# Patient Record
Sex: FEMALE | Race: WHITE | Employment: OTHER | URBAN - METROPOLITAN AREA
[De-identification: names, ages, dates, MRNs, and addresses within clinical notes are randomized per-mention and may not be internally consistent; named-entity substitution may affect disease eponyms.]

---

## 2017-01-16 ENCOUNTER — APPOINTMENT (OUTPATIENT)
Dept: GENERAL RADIOLOGY | Age: 82
DRG: 208 | End: 2017-01-16
Attending: PHYSICIAN ASSISTANT
Payer: MEDICARE

## 2017-01-16 ENCOUNTER — HOSPITAL ENCOUNTER (INPATIENT)
Age: 82
LOS: 1 days | Discharge: ANOTHER HOSPITAL | DRG: 208 | End: 2017-01-18
Attending: EMERGENCY MEDICINE | Admitting: HOSPITALIST
Payer: MEDICARE

## 2017-01-16 DIAGNOSIS — J96.01 ACUTE RESPIRATORY FAILURE WITH HYPOXIA (HCC): Primary | ICD-10-CM

## 2017-01-16 DIAGNOSIS — I48.91 ATRIAL FIBRILLATION WITH RVR (HCC): ICD-10-CM

## 2017-01-16 PROBLEM — J81.1 PULMONARY EDEMA: Status: ACTIVE | Noted: 2017-01-16

## 2017-01-16 PROBLEM — I46.9 CARDIAC ARREST (HCC): Status: ACTIVE | Noted: 2017-01-16

## 2017-01-16 PROBLEM — R19.7 DIARRHEA: Status: ACTIVE | Noted: 2017-01-16

## 2017-01-16 LAB
ABO + RH BLD: NORMAL
ALBUMIN SERPL BCP-MCNC: 3.5 G/DL (ref 3.4–5)
ALBUMIN/GLOB SERPL: 0.8 {RATIO} (ref 0.8–1.7)
ALP SERPL-CCNC: 115 U/L (ref 45–117)
ALT SERPL-CCNC: 11 U/L (ref 13–56)
ANION GAP BLD CALC-SCNC: 9 MMOL/L (ref 3–18)
APPEARANCE UR: CLEAR
APTT PPP: 26.6 SEC (ref 23–36.4)
ARTERIAL PATENCY WRIST A: YES
AST SERPL W P-5'-P-CCNC: 4 U/L (ref 15–37)
BACTERIA URNS QL MICRO: ABNORMAL /HPF
BASE DEFICIT BLD-SCNC: 7 MMOL/L
BASOPHILS # BLD AUTO: 0 K/UL (ref 0–0.06)
BASOPHILS # BLD: 0 % (ref 0–2)
BDY SITE: ABNORMAL
BILIRUB SERPL-MCNC: 0.3 MG/DL (ref 0.2–1)
BILIRUB UR QL: NEGATIVE
BLOOD GROUP ANTIBODIES SERPL: NORMAL
BNP SERPL-MCNC: 1958 PG/ML (ref 0–1800)
BUN SERPL-MCNC: 14 MG/DL (ref 7–18)
BUN/CREAT SERPL: 13 (ref 12–20)
CALCIUM SERPL-MCNC: 10.1 MG/DL (ref 8.5–10.1)
CHLORIDE SERPL-SCNC: 102 MMOL/L (ref 100–108)
CK MB CFR SERPL CALC: 2.5 % (ref 0–4)
CK MB SERPL-MCNC: 1.4 NG/ML (ref 0.5–3.6)
CK SERPL-CCNC: 56 U/L (ref 26–192)
CO2 SERPL-SCNC: 28 MMOL/L (ref 21–32)
COLOR UR: YELLOW
CREAT SERPL-MCNC: 1.07 MG/DL (ref 0.6–1.3)
DIFFERENTIAL METHOD BLD: NORMAL
EOSINOPHIL # BLD: 0.3 K/UL (ref 0–0.4)
EOSINOPHIL NFR BLD: 4 % (ref 0–5)
ERYTHROCYTE [DISTWIDTH] IN BLOOD BY AUTOMATED COUNT: 12.6 % (ref 11.6–14.5)
EST. AVERAGE GLUCOSE BLD GHB EST-MCNC: 160 MG/DL
FLUAV AG NPH QL IA: NEGATIVE
FLUBV AG NOSE QL IA: NEGATIVE
GAS FLOW.O2 O2 DELIVERY SYS: ABNORMAL L/MIN
GAS FLOW.O2 SETTING OXYMISER: 14 BPM
GLOBULIN SER CALC-MCNC: 4.2 G/DL (ref 2–4)
GLUCOSE BLD STRIP.AUTO-MCNC: 135 MG/DL (ref 70–110)
GLUCOSE SERPL-MCNC: 156 MG/DL (ref 74–99)
GLUCOSE UR STRIP.AUTO-MCNC: NEGATIVE MG/DL
HBA1C MFR BLD: 7.2 % (ref 4.5–5.6)
HCO3 BLD-SCNC: 19.9 MMOL/L (ref 22–26)
HCT VFR BLD AUTO: 43.6 % (ref 35–45)
HGB BLD-MCNC: 14.2 G/DL (ref 12–16)
HGB UR QL STRIP: NEGATIVE
INR PPP: 0.8 (ref 0.8–1.2)
INSPIRATION.DURATION SETTING TIME VENT: 0.9 SEC
KETONES UR QL STRIP.AUTO: NEGATIVE MG/DL
LACTATE SERPL-SCNC: 1.6 MMOL/L (ref 0.4–2)
LEUKOCYTE ESTERASE UR QL STRIP.AUTO: ABNORMAL
LIPASE SERPL-CCNC: 205 U/L (ref 73–393)
LYMPHOCYTES # BLD AUTO: 21 % (ref 21–52)
LYMPHOCYTES # BLD: 1.5 K/UL (ref 0.9–3.6)
MCH RBC QN AUTO: 31.5 PG (ref 24–34)
MCHC RBC AUTO-ENTMCNC: 32.6 G/DL (ref 31–37)
MCV RBC AUTO: 96.7 FL (ref 74–97)
MONOCYTES # BLD: 0.4 K/UL (ref 0.05–1.2)
MONOCYTES NFR BLD AUTO: 5 % (ref 3–10)
NEUTS SEG # BLD: 4.9 K/UL (ref 1.8–8)
NEUTS SEG NFR BLD AUTO: 70 % (ref 40–73)
NITRITE UR QL STRIP.AUTO: NEGATIVE
O2/TOTAL GAS SETTING VFR VENT: 100 %
PCO2 BLD: 46.3 MMHG (ref 35–45)
PEEP RESPIRATORY: 5 CMH2O
PH BLD: 7.24 [PH] (ref 7.35–7.45)
PH UR STRIP: 6 [PH] (ref 5–8)
PLATELET # BLD AUTO: 210 K/UL (ref 135–420)
PMV BLD AUTO: 10.7 FL (ref 9.2–11.8)
PO2 BLD: 258 MMHG (ref 80–100)
POTASSIUM SERPL-SCNC: 4.5 MMOL/L (ref 3.5–5.5)
PROT SERPL-MCNC: 7.7 G/DL (ref 6.4–8.2)
PROT UR STRIP-MCNC: NEGATIVE MG/DL
PROTHROMBIN TIME: 11.2 SEC (ref 11.5–15.2)
RBC # BLD AUTO: 4.51 M/UL (ref 4.2–5.3)
RBC #/AREA URNS HPF: 0 /HPF (ref 0–5)
SAO2 % BLD: 100 % (ref 92–97)
SERVICE CMNT-IMP: ABNORMAL
SODIUM SERPL-SCNC: 139 MMOL/L (ref 136–145)
SP GR UR REFRACTOMETRY: 1.02 (ref 1–1.03)
SPECIMEN EXP DATE BLD: NORMAL
SPECIMEN TYPE: ABNORMAL
TOTAL RESP. RATE, ITRR: 14
TROPONIN I SERPL-MCNC: 0.02 NG/ML (ref 0–0.06)
UROBILINOGEN UR QL STRIP.AUTO: 0.2 EU/DL (ref 0.2–1)
VENTILATION MODE VENT: ABNORMAL
VOLUME CONTROL IVLC: YES
VT SETTING VENT: 450 ML
WBC # BLD AUTO: 7.1 K/UL (ref 4.6–13.2)
WBC URNS QL MICRO: ABNORMAL /HPF (ref 0–5)

## 2017-01-16 PROCEDURE — 77010033678 HC OXYGEN DAILY

## 2017-01-16 PROCEDURE — 85610 PROTHROMBIN TIME: CPT | Performed by: PHYSICIAN ASSISTANT

## 2017-01-16 PROCEDURE — 93005 ELECTROCARDIOGRAM TRACING: CPT

## 2017-01-16 PROCEDURE — 85730 THROMBOPLASTIN TIME PARTIAL: CPT | Performed by: PHYSICIAN ASSISTANT

## 2017-01-16 PROCEDURE — 77030020454 HC TU ET CUF HI/LO COVD -B

## 2017-01-16 PROCEDURE — 74011000258 HC RX REV CODE- 258: Performed by: HOSPITALIST

## 2017-01-16 PROCEDURE — 86900 BLOOD TYPING SEROLOGIC ABO: CPT | Performed by: PHYSICIAN ASSISTANT

## 2017-01-16 PROCEDURE — 82550 ASSAY OF CK (CPK): CPT | Performed by: PHYSICIAN ASSISTANT

## 2017-01-16 PROCEDURE — 74011250636 HC RX REV CODE- 250/636

## 2017-01-16 PROCEDURE — 92950 HEART/LUNG RESUSCITATION CPR: CPT

## 2017-01-16 PROCEDURE — 77030034850

## 2017-01-16 PROCEDURE — 87804 INFLUENZA ASSAY W/OPTIC: CPT | Performed by: PHYSICIAN ASSISTANT

## 2017-01-16 PROCEDURE — 77030008477 HC STYL SATN SLP COVD -A

## 2017-01-16 PROCEDURE — 83690 ASSAY OF LIPASE: CPT | Performed by: PHYSICIAN ASSISTANT

## 2017-01-16 PROCEDURE — 31500 INSERT EMERGENCY AIRWAY: CPT

## 2017-01-16 PROCEDURE — 83036 HEMOGLOBIN GLYCOSYLATED A1C: CPT | Performed by: HOSPITALIST

## 2017-01-16 PROCEDURE — 74011250636 HC RX REV CODE- 250/636: Performed by: HOSPITALIST

## 2017-01-16 PROCEDURE — 82803 BLOOD GASES ANY COMBINATION: CPT

## 2017-01-16 PROCEDURE — 99285 EMERGENCY DEPT VISIT HI MDM: CPT

## 2017-01-16 PROCEDURE — 74011250637 HC RX REV CODE- 250/637: Performed by: INTERNAL MEDICINE

## 2017-01-16 PROCEDURE — 83880 ASSAY OF NATRIURETIC PEPTIDE: CPT | Performed by: PHYSICIAN ASSISTANT

## 2017-01-16 PROCEDURE — 82962 GLUCOSE BLOOD TEST: CPT

## 2017-01-16 PROCEDURE — 74011000258 HC RX REV CODE- 258: Performed by: EMERGENCY MEDICINE

## 2017-01-16 PROCEDURE — 74011250636 HC RX REV CODE- 250/636: Performed by: EMERGENCY MEDICINE

## 2017-01-16 PROCEDURE — 5A2204Z RESTORATION OF CARDIAC RHYTHM, SINGLE: ICD-10-PCS | Performed by: EMERGENCY MEDICINE

## 2017-01-16 PROCEDURE — 83605 ASSAY OF LACTIC ACID: CPT | Performed by: PHYSICIAN ASSISTANT

## 2017-01-16 PROCEDURE — 0BH17EZ INSERTION OF ENDOTRACHEAL AIRWAY INTO TRACHEA, VIA NATURAL OR ARTIFICIAL OPENING: ICD-10-PCS | Performed by: EMERGENCY MEDICINE

## 2017-01-16 PROCEDURE — 96361 HYDRATE IV INFUSION ADD-ON: CPT

## 2017-01-16 PROCEDURE — 94762 N-INVAS EAR/PLS OXIMTRY CONT: CPT

## 2017-01-16 PROCEDURE — 81001 URINALYSIS AUTO W/SCOPE: CPT | Performed by: PHYSICIAN ASSISTANT

## 2017-01-16 PROCEDURE — 77030008683 HC TU ET CUF COVD -A

## 2017-01-16 PROCEDURE — 87040 BLOOD CULTURE FOR BACTERIA: CPT | Performed by: HOSPITALIST

## 2017-01-16 PROCEDURE — 36600 WITHDRAWAL OF ARTERIAL BLOOD: CPT

## 2017-01-16 PROCEDURE — 77030008768 HC TU NG VYGC -A

## 2017-01-16 PROCEDURE — 74011250636 HC RX REV CODE- 250/636: Performed by: INTERNAL MEDICINE

## 2017-01-16 PROCEDURE — 74011250636 HC RX REV CODE- 250/636: Performed by: PHYSICIAN ASSISTANT

## 2017-01-16 PROCEDURE — 5A1935Z RESPIRATORY VENTILATION, LESS THAN 24 CONSECUTIVE HOURS: ICD-10-PCS | Performed by: INTERNAL MEDICINE

## 2017-01-16 PROCEDURE — 5A12012 PERFORMANCE OF CARDIAC OUTPUT, SINGLE, MANUAL: ICD-10-PCS | Performed by: EMERGENCY MEDICINE

## 2017-01-16 PROCEDURE — 96375 TX/PRO/DX INJ NEW DRUG ADDON: CPT

## 2017-01-16 PROCEDURE — 74011000258 HC RX REV CODE- 258: Performed by: INTERNAL MEDICINE

## 2017-01-16 PROCEDURE — 85025 COMPLETE CBC W/AUTO DIFF WBC: CPT | Performed by: PHYSICIAN ASSISTANT

## 2017-01-16 PROCEDURE — 74011000250 HC RX REV CODE- 250: Performed by: HOSPITALIST

## 2017-01-16 PROCEDURE — 80053 COMPREHEN METABOLIC PANEL: CPT | Performed by: PHYSICIAN ASSISTANT

## 2017-01-16 PROCEDURE — 71010 XR CHEST PORT: CPT

## 2017-01-16 PROCEDURE — 94002 VENT MGMT INPAT INIT DAY: CPT

## 2017-01-16 PROCEDURE — 96365 THER/PROPH/DIAG IV INF INIT: CPT

## 2017-01-16 PROCEDURE — 74011000250 HC RX REV CODE- 250: Performed by: EMERGENCY MEDICINE

## 2017-01-16 RX ORDER — ENOXAPARIN SODIUM 100 MG/ML
40 INJECTION SUBCUTANEOUS EVERY 24 HOURS
Status: DISCONTINUED | OUTPATIENT
Start: 2017-01-16 | End: 2017-01-18 | Stop reason: HOSPADM

## 2017-01-16 RX ORDER — GENTAMICIN SULFATE 3 MG/ML
1 SOLUTION/ DROPS OPHTHALMIC 4 TIMES DAILY
COMMUNITY

## 2017-01-16 RX ORDER — PROPOFOL 10 MG/ML
5-50 VIAL (ML) INTRAVENOUS
Status: COMPLETED | OUTPATIENT
Start: 2017-01-16 | End: 2017-01-16

## 2017-01-16 RX ORDER — INSULIN LISPRO 100 [IU]/ML
INJECTION, SOLUTION INTRAVENOUS; SUBCUTANEOUS EVERY 6 HOURS
Status: DISCONTINUED | OUTPATIENT
Start: 2017-01-16 | End: 2017-01-17

## 2017-01-16 RX ORDER — DEXTROSE 50 % IN WATER (D50W) INTRAVENOUS SYRINGE
25-50 AS NEEDED
Status: DISCONTINUED | OUTPATIENT
Start: 2017-01-16 | End: 2017-01-18 | Stop reason: HOSPADM

## 2017-01-16 RX ORDER — SODIUM CHLORIDE 9 MG/ML
50 INJECTION, SOLUTION INTRAVENOUS CONTINUOUS
Status: DISCONTINUED | OUTPATIENT
Start: 2017-01-16 | End: 2017-01-16

## 2017-01-16 RX ORDER — MIDAZOLAM HYDROCHLORIDE 1 MG/ML
3 INJECTION, SOLUTION INTRAMUSCULAR; INTRAVENOUS
Status: COMPLETED | OUTPATIENT
Start: 2017-01-16 | End: 2017-01-16

## 2017-01-16 RX ORDER — LORAZEPAM 2 MG/ML
1 INJECTION INTRAMUSCULAR ONCE
Status: DISCONTINUED | OUTPATIENT
Start: 2017-01-16 | End: 2017-01-16 | Stop reason: CLARIF

## 2017-01-16 RX ORDER — MIDAZOLAM HYDROCHLORIDE 1 MG/ML
1 INJECTION, SOLUTION INTRAMUSCULAR; INTRAVENOUS
Status: COMPLETED | OUTPATIENT
Start: 2017-01-16 | End: 2017-01-16

## 2017-01-16 RX ORDER — TRIPROLIDINE/PSEUDOEPHEDRINE 2.5MG-60MG
400 TABLET ORAL
Status: DISCONTINUED | OUTPATIENT
Start: 2017-01-16 | End: 2017-01-16

## 2017-01-16 RX ORDER — FENTANYL CITRATE 50 UG/ML
25 INJECTION, SOLUTION INTRAMUSCULAR; INTRAVENOUS
Status: DISCONTINUED | OUTPATIENT
Start: 2017-01-16 | End: 2017-01-17

## 2017-01-16 RX ORDER — SODIUM CHLORIDE 9 MG/ML
500 INJECTION, SOLUTION INTRAVENOUS ONCE
Status: COMPLETED | OUTPATIENT
Start: 2017-01-16 | End: 2017-01-16

## 2017-01-16 RX ORDER — MAGNESIUM SULFATE 100 %
4 CRYSTALS MISCELLANEOUS AS NEEDED
Status: DISCONTINUED | OUTPATIENT
Start: 2017-01-16 | End: 2017-01-18 | Stop reason: HOSPADM

## 2017-01-16 RX ORDER — SOTALOL HYDROCHLORIDE 80 MG/1
80 TABLET ORAL EVERY 12 HOURS
Status: DISCONTINUED | OUTPATIENT
Start: 2017-01-16 | End: 2017-01-16

## 2017-01-16 RX ORDER — FUROSEMIDE 10 MG/ML
40 INJECTION INTRAMUSCULAR; INTRAVENOUS DAILY
Status: DISCONTINUED | OUTPATIENT
Start: 2017-01-17 | End: 2017-01-16

## 2017-01-16 RX ORDER — ETOMIDATE 2 MG/ML
20 INJECTION INTRAVENOUS
Status: COMPLETED | OUTPATIENT
Start: 2017-01-16 | End: 2017-01-16

## 2017-01-16 RX ORDER — FUROSEMIDE 10 MG/ML
80 INJECTION INTRAMUSCULAR; INTRAVENOUS
Status: COMPLETED | OUTPATIENT
Start: 2017-01-16 | End: 2017-01-16

## 2017-01-16 RX ORDER — METRONIDAZOLE 500 MG/100ML
500 INJECTION, SOLUTION INTRAVENOUS EVERY 8 HOURS
Status: DISCONTINUED | OUTPATIENT
Start: 2017-01-16 | End: 2017-01-18 | Stop reason: HOSPADM

## 2017-01-16 RX ORDER — ONDANSETRON 2 MG/ML
4 INJECTION INTRAMUSCULAR; INTRAVENOUS
Status: COMPLETED | OUTPATIENT
Start: 2017-01-16 | End: 2017-01-16

## 2017-01-16 RX ORDER — PROPOFOL 10 MG/ML
INJECTION, EMULSION INTRAVENOUS
Status: COMPLETED
Start: 2017-01-16 | End: 2017-01-16

## 2017-01-16 RX ORDER — MIDAZOLAM HYDROCHLORIDE 1 MG/ML
1 INJECTION, SOLUTION INTRAMUSCULAR; INTRAVENOUS
Status: DISCONTINUED | OUTPATIENT
Start: 2017-01-16 | End: 2017-01-17

## 2017-01-16 RX ORDER — MIDAZOLAM HYDROCHLORIDE 1 MG/ML
2 INJECTION, SOLUTION INTRAMUSCULAR; INTRAVENOUS
Status: COMPLETED | OUTPATIENT
Start: 2017-01-16 | End: 2017-01-16

## 2017-01-16 RX ORDER — SODIUM CHLORIDE 9 MG/ML
1000 INJECTION, SOLUTION INTRAVENOUS ONCE
Status: COMPLETED | OUTPATIENT
Start: 2017-01-16 | End: 2017-01-16

## 2017-01-16 RX ORDER — IPRATROPIUM BROMIDE AND ALBUTEROL SULFATE 2.5; .5 MG/3ML; MG/3ML
3 SOLUTION RESPIRATORY (INHALATION)
Status: DISCONTINUED | OUTPATIENT
Start: 2017-01-16 | End: 2017-01-18 | Stop reason: HOSPADM

## 2017-01-16 RX ORDER — DILTIAZEM HYDROCHLORIDE 5 MG/ML
INJECTION INTRAVENOUS
Status: DISPENSED
Start: 2017-01-16 | End: 2017-01-17

## 2017-01-16 RX ORDER — CHLORHEXIDINE GLUCONATE 1.2 MG/ML
10 RINSE ORAL EVERY 12 HOURS
Status: DISCONTINUED | OUTPATIENT
Start: 2017-01-16 | End: 2017-01-17

## 2017-01-16 RX ORDER — NOREPINEPHRINE BITARTRATE/D5W 8 MG/250ML
2-16 PLASTIC BAG, INJECTION (ML) INTRAVENOUS
Status: DISCONTINUED | OUTPATIENT
Start: 2017-01-16 | End: 2017-01-16

## 2017-01-16 RX ORDER — ROCURONIUM BROMIDE 10 MG/ML
50 INJECTION, SOLUTION INTRAVENOUS
Status: COMPLETED | OUTPATIENT
Start: 2017-01-16 | End: 2017-01-16

## 2017-01-16 RX ORDER — HYDRALAZINE HYDROCHLORIDE 20 MG/ML
20 INJECTION INTRAMUSCULAR; INTRAVENOUS
Status: DISPENSED | OUTPATIENT
Start: 2017-01-16 | End: 2017-01-17

## 2017-01-16 RX ADMIN — METRONIDAZOLE 500 MG: 500 INJECTION, SOLUTION INTRAVENOUS at 21:44

## 2017-01-16 RX ADMIN — MIDAZOLAM HYDROCHLORIDE 3 MG: 1 INJECTION, SOLUTION INTRAMUSCULAR; INTRAVENOUS at 15:08

## 2017-01-16 RX ADMIN — SODIUM CHLORIDE 500 ML: 900 INJECTION, SOLUTION INTRAVENOUS at 15:20

## 2017-01-16 RX ADMIN — ONDANSETRON 4 MG: 2 INJECTION INTRAMUSCULAR; INTRAVENOUS at 12:53

## 2017-01-16 RX ADMIN — CHLORHEXIDINE GLUCONATE 10 ML: 1.2 RINSE ORAL at 23:43

## 2017-01-16 RX ADMIN — ETOMIDATE 20 MG: 2 INJECTION, SOLUTION INTRAVENOUS at 13:53

## 2017-01-16 RX ADMIN — Medication 50 MG: at 13:54

## 2017-01-16 RX ADMIN — VANCOMYCIN HYDROCHLORIDE 1250 MG: 10 INJECTION, POWDER, LYOPHILIZED, FOR SOLUTION INTRAVENOUS at 22:57

## 2017-01-16 RX ADMIN — FENTANYL CITRATE 25 MCG/HR: 50 INJECTION, SOLUTION INTRAMUSCULAR; INTRAVENOUS at 20:18

## 2017-01-16 RX ADMIN — Medication 5 MCG/MIN: at 18:39

## 2017-01-16 RX ADMIN — ENOXAPARIN SODIUM 40 MG: 40 INJECTION SUBCUTANEOUS at 23:41

## 2017-01-16 RX ADMIN — MIDAZOLAM HYDROCHLORIDE 1 MG: 1 INJECTION, SOLUTION INTRAMUSCULAR; INTRAVENOUS at 16:07

## 2017-01-16 RX ADMIN — FUROSEMIDE 80 MG: 10 INJECTION, SOLUTION INTRAMUSCULAR; INTRAVENOUS at 14:05

## 2017-01-16 RX ADMIN — SODIUM CHLORIDE 1000 ML: 900 INJECTION, SOLUTION INTRAVENOUS at 12:53

## 2017-01-16 RX ADMIN — Medication 10 MCG/KG/MIN: at 18:17

## 2017-01-16 RX ADMIN — PIPERACILLIN AND TAZOBACTAM 3.38 G: 3; .375 INJECTION, POWDER, FOR SOLUTION INTRAVENOUS at 21:06

## 2017-01-16 RX ADMIN — PROPOFOL 10 MCG/KG/MIN: 10 INJECTION, EMULSION INTRAVENOUS at 18:17

## 2017-01-16 RX ADMIN — MIDAZOLAM 1 MG/HR: 5 INJECTION INTRAMUSCULAR; INTRAVENOUS at 15:36

## 2017-01-16 RX ADMIN — MIDAZOLAM HYDROCHLORIDE 2 MG: 1 INJECTION, SOLUTION INTRAMUSCULAR; INTRAVENOUS at 16:56

## 2017-01-16 NOTE — PROGRESS NOTES
conducted a Follow up consultation and Spiritual Assessment for Juan Jose Phillips, who is a 80 y.o.,female. Patient is on vent. Father Leonard Haile had just performed Sacrament of the Sick for patient who is Christianity. Son and friend at the bedside. Son believes she attends [employer]. Son is aware of \"how sick she is. \"     The  provided the following Interventions with son:  Continued the relationship of care and support. Listened empathically. Offered assurance of continued prayer on patients behalf. Chart reviewed. The following outcomes were achieved: Son expressed gratitude for Spiritual Care visit. Assessment:  There are no further spiritual or Quaker issues which require Spiritual Care Services intervention at this time. Plan:  Chaplains will continue to follow and will provide pastoral care as needed or requested.  recommends bedside caregivers page the  on duty if patient shows signs of acute spiritual or emotional distress. [address] [place], [place].    Board Certified   [phone] - Office

## 2017-01-16 NOTE — ED NOTES
Sebsequent EKG obtained. Patient noted to have shortness of breath with audible rales. Patient with continued chest pain and states, \"I am going to die. \"  Patient noted to have decreasing o2 sats. Provider called stat to bedside. Patient placed on non-re breather by Letty Coburn R.N. Patient continues with decreasing sats and states that \"something is not right. While placing patient on the non re breather patient noted to have a change in color to the face, and eyes rolling back in head. CPR initiated.

## 2017-01-16 NOTE — ED NOTES
At bedside to [place], [place] w/ rectal exam. Pt cleaned with warm wipes and placed in clean brief. Friend present at bedside.

## 2017-01-16 NOTE — H&P
History & Physical    Patient: Sarai Flowers MRN: [id number]  CSN: [id number]    YOB: 1931  Age: 80 y.o. Sex: female      DOA: 1/16/2017  Primary Care Provider:  Michelle Lewis MD      Assessment/Plan     Patient Active Problem List   Diagnosis Code    Severe anemia D64.9    Atrial fibrillation (HCC) I48.91    GI bleed K92.2    Diabetes ([facility] [address] E11.9    CAD (coronary artery disease) I25.10    Acute respiratory failure with hypoxia ([facility]) J96.01    Cardiac arrest ([facility] [address] I46.9    A-fib ([facility]) I48.91    Pulmonary edema J81.1         Admit to ICU   1. Respiratory arrest   2. Cardiac arrest   3. afib with rvr   4. Pulmonary edema  5. DM type II  6. Diarrhea  7 cad   8. Anxiety   Plan  Pulm: on vent, Dr. Francia Arias on board, vent management. Will give lasix for pulmonary edema,   Will have cta and echo   Card: rate controlled. respiratory arrest trigger cardiac arrest, cardizem gtt ordered will start if needed, on stalo home meds, echo ,case discussed with Dr. Damon Negrete   GI diarrhea: will have ct abdomen , ppi, npo now   Neuro: sedation   Renal: catheter, in/out ,electrolytes icu protocol   Endo: DM type II , ssi      70 minutes of critical care time spent in the direct evaluation and treatment of this high risk patient. The reason for providing this level of medical care for this critically ill patient was due a critical illness that impaired one or more vital organ systems such that there was a high probability of imminent or life threatening deterioration in the patients condition. This care involved high complexity decision making to assess, manipulate, and support vital system functions, to treat this degreee vital organ system failure and to prevent further life threatening deterioration of the patients condition. DVT : lovenox. ppi proph  CC: diarrhea        HPI:     Sarai Flowers is a 80 y.o. female who hx of cad, MI, DM type II came to  Ed due to diarrhea and abdomen pain.   she presented sob and desat when she got the cr abdomen in ED. She presented respiratory failure and then cardiac arrest. acls was performed. She was intubated in Ed. ekg-1st degree block-afib, cardizem was given. Cxr; indicated pulmonary edema. Visit Vitals    /66    Pulse 84    Temp 97.4 °F (36.3 °C)    Resp 16    Ht 5' 4\" (1.626 m)    Wt 69.9 kg (154 lb)    SpO2 99%    BMI 26.43 kg/m2      O2 Device: Room air      Past Medical History   Diagnosis Date    [facility]-fib ([facility] [address]     CAD (coronary artery disease)     Diabetes ([facility] [address]     High cholesterol     MI (myocardial infarction) (HCC)     Neuropathy     UTI (urinary tract infection)        Past Surgical History   Procedure Laterality Date    Hx hysterectomy      Hx cholecystectomy      Hx breast lumpectomy      Hx coronary stent placement         History reviewed. No pertinent family history. Social History     Social History    Marital status:      Spouse name: N/A    Number of children: N/A    Years of education: N/A     Social History Main Topics    Smoking status: Former Smoker    Smokeless tobacco: None    Alcohol use No    Drug use: No    Sexual activity: Not Asked     Other Topics Concern    None     Social History Narrative       Prior to Admission medications    Medication Sig Start Date End Date Taking? Authorizing Provider   gentamicin (GARAMYCIN) 0.3 % ophthalmic solution Administer 1 Drop to both eyes every four (4) hours. Yes Dali Childers MD   escitalopram oxalate (LEXAPRO) 5 mg tablet Take 5 mg by mouth daily. Dali Childers MD   sotalol (BETAPACE) 80 mg tablet Take 1 Tab by mouth every twelve (12) hours. 4/9/15   Brittany Antonio MD   gabapentin (NEURONTIN) 300 mg capsule Take 300 mg by mouth three (3) times daily. Dali Childers MD   metFORMIN (GLUCOPHAGE) 500 mg tablet Take  by mouth two (2) times daily (with meals).     Dail Childers MD       Allergies   Allergen Reactions    Excedrin [Acetaminophen-Caffeine] Other (comments)     \"climbing the walls\"    Zantac [Ranitidine Hcl] Other (comments)     \"climbing the walls\"       Review of Systems  Unable to obtain due to intubation         Physical Exam:     Physical Exam:  Visit Vitals    /66    Pulse 84    Temp 97.4 °F (36.3 °C)    Resp 16    Ht 5' 4\" (1.626 m)    Wt 69.9 kg (154 lb)    SpO2 99%    BMI 26.43 kg/m2      O2 Device: Room air    Temp (24hrs), Av.4 °F (36.3 °C), Min:97.4 °F (36.3 °C), Max:97.4 °F (36.3 °C)             General:   intubated   Head:  Normocephalic, without obvious abnormality, atraumatic. Eyes:  Conjunctivae/corneas clear, sclera anicteric,   Nose: Nares normal. No drainage or sinus tenderness. Throat: Lips, mucosa, and tongue normal. ET tube noted    Neck: Supple, symmetrical, trachea midline, no adenopathy. Lungs:   Clear to auscultation bilaterally. Heart:  Irregular irregular , S1, S2 normal, no murmur, click, rub or gallop. Abdomen: Soft, non-tender. Bowel sounds normal. No masses,  No organomegaly. Extremities: Extremities normal, atraumatic, no cyanosis or edema. Pulses: 2+ and symmetric all extremities. Skin: Skin color-pink, texture, turgor normal. No rashes or lesions.  Capillary refill normal    Neurologic: On sedation        Labs Reviewed:    BMP:   Lab Results   Component Value Date/Time     2017 12:40 PM    K 4.5 2017 12:40 PM     2017 12:40 PM    CO2 28 2017 12:40 PM    AGAP 9 2017 12:40 PM     (H) 2017 12:40 PM    BUN 14 2017 12:40 PM    CREA 1.07 2017 12:40 PM    GFRAA 59 (L) 2017 12:40 PM    GFRNA 49 (L) 2017 12:40 PM     CMP:   Lab Results   Component Value Date/Time     2017 12:40 PM    K 4.5 2017 12:40 PM     2017 12:40 PM    CO2 28 2017 12:40 PM    AGAP 9 2017 12:40 PM     (H) 2017 12:40 PM    BUN 14 2017 12:40 PM    CREA 1.07 2017 12:40 PM GFRAA 59 (L) 01/16/2017 12:40 PM    GFRNA 49 (L) 01/16/2017 12:40 PM    CA 10.1 01/16/2017 12:40 PM    ALB 3.5 01/16/2017 12:40 PM    TP 7.7 01/16/2017 12:40 PM    GLOB 4.2 (H) 01/16/2017 12:40 PM    AGRAT 0.8 01/16/2017 12:40 PM    SGOT 4 (L) 01/16/2017 12:40 PM    ALT 11 (L) 01/16/2017 12:40 PM     CBC:   Lab Results   Component Value Date/Time    WBC 7.1 01/16/2017 12:40 PM    HGB 14.2 01/16/2017 12:40 PM    HCT 43.6 01/16/2017 12:40 PM     01/16/2017 12:40 PM     All Cardiac Markers in the last 24 hours:   Lab Results   Component Value Date/Time    CPK 56 01/16/2017 12:40 PM    CKMB 1.4 01/16/2017 12:40 PM    CKND1 2.5 01/16/2017 12:40 PM    TROIQ 0.02 01/16/2017 12:40 PM     Recent Glucose Results:   Lab Results   Component Value Date/Time     (H) 01/16/2017 12:40 PM     ABG:   Lab Results   Component Value Date/Time    PHI 7.242 (LL) 01/16/2017 02:48 PM    PCO2I 46.3 (H) 01/16/2017 02:48 PM    PO2I 258 (H) 01/16/2017 02:48 PM    HCO3I 19.9 (L) 01/16/2017 02:48 PM    FIO2I 100 01/16/2017 02:48 PM     COAGS:   Lab Results   Component Value Date/Time    APTT 26.6 01/16/2017 12:40 PM    PTP 11.2 (L) 01/16/2017 12:40 PM    INR 0.8 01/16/2017 12:40 PM     Liver Panel:   Lab Results   Component Value Date/Time    ALB 3.5 01/16/2017 12:40 PM    TP 7.7 01/16/2017 12:40 PM    GLOB 4.2 (H) 01/16/2017 12:40 PM    AGRAT 0.8 01/16/2017 12:40 PM    SGOT 4 (L) 01/16/2017 12:40 PM    ALT 11 (L) 01/16/2017 12:40 PM     01/16/2017 12:40 PM     Pancreatic Markers:   Lab Results   Component Value Date/Time    LPSE 205 01/16/2017 12:40 PM       Procedures/imaging: see electronic medical records for all procedures/Xrays and details which were not copied into this note but were reviewed prior to creation of Martinez Wilhelm MD, Internal Medicine     CC: Delores Briones MD

## 2017-01-16 NOTE — ED TRIAGE NOTES
C/o nausea and diarrhea, pt c/o abd pain, pt states abd hernia, runny nose,   Sepsis Screening completed    (  )Patient meets SIRS criteria. ( x )Patient does not meet SIRS criteria.       SIRS Criteria is achieved when two or more of the following are present   Temperature < 96.8°F (36°C) or > 100.9°F (38.3°C)   Heart Rate > 90 beats per minute   Respiratory Rate > 20 beats per minute   WBC count > 12,000 or <4,000 or > 10% bands

## 2017-01-16 NOTE — ED NOTES
Assisted up to bathroom. Collected a clean catch urine specimen. Had a bowel movement. Assisted back to bed. Side rails up. Urine sample to lab.

## 2017-01-16 NOTE — PROGRESS NOTES
visited with the family of Jenifer Hauser, who is a 80 y.o.,female. The  provided the following Interventions:  Initiated a relationship of care and support. with patient's friend. Patient is vented. Provided prayer for patient's recovery and strength for her family. Patient's son is en route from [place]. Plan:  Chaplains will continue to follow and will provide pastoral care on an as needed/requested basis.  recommends bedside caregivers page  on duty if patient shows signs of acute spiritual or emotional distress. Rev.  [facility] [place]  [phone]

## 2017-01-16 NOTE — ED NOTES
6:57 PM   Pts BP and HR dropped. Dr. Pasquale Garcia at bedside. Improved after stopping propofol to normal-tensive to slightly hypertensive BP, HR improved as well to NSR in 80s. However, Dr. Oskar Mac was considering starting pt on Levophed, so she could keep pt on propofol to keep her on the ventilator. Recommended stopping propofol or any other sedating drugs, BP meds. Recommended considering changing to ketamine IV  however final determination of sedation for vent TBD by the intensivist or anesthesiologist. Meanwhile given pt has a normal lactic acid, normal vital signs off the sedation meds, would not want to put a central line in a pt without overt sepsis or persistent hypotension given its inherent potential complications. Pt has adequate peripheral lines in both arms (an 18 and a 20 gauge).     Written by CESILIA Milligan, as dictated by Bk Acevedo MD

## 2017-01-16 NOTE — ED NOTES
Levophed held due to pt's BP of 179/103. Pt w/  BP's of 147/84 and 159/87 post holding Levophed . Pt's heart rate in the 80's. Dr. Angel Burrell to bedside w/ orders to hold drips at this time. Family remains at bedside.

## 2017-01-16 NOTE — ED NOTES
Pt bedside report received from Josey Hinojosa RN. Pt resting in stretcher with side rails raised. Pt remains on ventilator and cardiac monitor. RT at bedside at this time. Care of pt assumed.

## 2017-01-16 NOTE — ED NOTES
Pt raising arms and attempting to grab ETT. Pt placed in soft restraints for pt safety per MD order. Pt remains on drip. Pt remains on cardiac monitor.

## 2017-01-16 NOTE — CONSULTS
[facility]  Pulmonary, Critical Care, and Sleep Medicine    Name: Jhony Mccullough MRN: [id number]   : 10/25/1931 Hospital: [facility] [place]   Date: 2017        Critical Care Initial Patient Consult      IMPRESSION:   Acute respiratory failure- secondary to acute pulmonary edema precipitated by accelerated HTN and Atrial fib with RVR. Currently intubated on ventilatory support  Atrial fibrillation with RVR- has had h/o atrial fib  Gastroenteritis  AMS  Anxiety   Patient Active Problem List   Diagnosis Code    Severe anemia D64.9    Atrial fibrillation (HCC) I48.91    GI bleed K92.2    Diabetes ([facility]) E11.9    CAD (coronary artery disease) I25.10    Acute respiratory failure with hypoxia ([facility]) J96.01        RECOMMENDATIONS:   · Will maintain ventilatory support until further stabilized. Orders placed and vent settings adjusted  · VAP bundle  · Sedation for RASS goal -2  · Diuresis and atrial fib rate control per cardiology  · Gentle hydration  · Glycemic control  · Correct electrolytes  · Stress ulcer and DVT prophylaxis  · Discussed with ER staff and family at bedside. Subjective/History: This patient has been seen and evaluated at the request of Dr. Oscar Lyons for Acute respiratory failure. Patient is a 80 y.o. female  with a PMHx of DM, CAD, and HLD presenting to the ED C/O dizziness, abdominal pain, and headache onset last night. Patient currently intubated and not able to provide information. History obtained by speaking with friend and ER staff>  \" Associated sx include periumbilical abd pain at an 8/10, dry mouth, hallucinations (saw her \"neighbor's dog in the room\"), diarrhea (soft stool), rhinorrhea, and nausea. Pt denies eating any suspicious food or sick contacts recently. Pt has not taken an abx pill recently. Pt has not received her flu shot recently. Pt has a PCP Dr. Dion Crowder.  Pt reports an abd hernia with more pain at this site than usual. Pt has a PMHx of A-FIB, MI, UTI, and neuropathy. Pt reports allergies to Excedrin and Zantac. PSHx of hysterectomy, cholecystectomy, breast lumpectomy, and coronary stent placement. Pt reports some anxiety with thoughts of CT scanning or other small spaces. Pt is a former smoker and denies EtOH and recreational drug use. Pt denies fever, sore throat, chest pain, SOB, cough, vomiting, change in appetite, dysuria, and any other associated signs and sx\"  In ER patient became suddenly tachycardic with increased BP and was noted to be in atrial fib with RVR- was briefly pulseless and unresponsive and responded to 2-3 min of CPR. Intubated ,sedated and converted with shock. Currently on Cardizem drip. The patient is critically ill and can not provide additional history due to Unconsciousness, Ventilated and Unable to speak. Past Medical History   Diagnosis Date    A-fib [facility])     CAD (coronary artery disease)     Diabetes [facility]     High cholesterol     MI [facility]myocardial infarction) [facility]     Neuropathy     UTI (urinary tract infection)       Past Surgical History   Procedure Laterality Date    Hx hysterectomy      Hx cholecystectomy      Hx breast lumpectomy      Hx coronary stent placement        Prior to Admission medications    Medication Sig Start Date End Date Taking? Authorizing Provider   gentamicin (GARAMYCIN) 0.3 % ophthalmic solution Administer 1 Drop to both eyes every four (4) hours. Yes Dali Childers MD   escitalopram oxalate (LEXAPRO) 5 mg tablet Take 5 mg by mouth daily. Dali Childers MD   sotalol (BETAPACE) 80 mg tablet Take 1 Tab by mouth every twelve (12) hours. 4/9/15   Devin Hedrick MD   gabapentin (NEURONTIN) 300 mg capsule Take 300 mg by mouth three (3) times daily. Dali Childers MD   metFORMIN (GLUCOPHAGE) 500 mg tablet Take  by mouth two (2) times daily (with meals).     Dali Childers MD     Current Facility-Administered Medications   Medication Dose Route Frequency    dilTIAZem (CARDIZEM) 5 mg/mL injection        midazolam (VERSED) 100 mg in 0.9% sodium chloride 100 mL infusion  1 mg/hr IntraVENous TITRATE    hydrALAZINE (APRESOLINE) 20 mg/mL injection 20 mg  20 mg IntraVENous NOW    dilTIAZem (CARDIZEM) 100 mg in 0.9% sodium chloride (MBP/ADV) 100 mL infusion  0-15 mg/hr IntraVENous TITRATE     Allergies   Allergen Reactions    Excedrin [Acetaminophen-Caffeine] Other (comments)     \"climbing the walls\"    Zantac [Ranitidine Hcl] Other (comments)     \"climbing the walls\"      Social History   Substance Use Topics    Smoking status: Former Smoker    Smokeless tobacco: Not on file    Alcohol use No      History reviewed. No pertinent family history. Review of Systems:  Review of systems not obtained due to patient factors. Objective:   Vital Signs:    Visit Vitals    /66    Pulse 84    Temp 97.4 °F (36.3 °C)    Resp 16    Ht 5' 4\" (1.626 m)    Wt 69.9 kg (154 lb)    SpO2 99%    BMI 26.43 kg/m2       O2 Device: Room air       Temp (24hrs), Av.4 °F (36.3 °C), Min:97.4 °F (36.3 °C), Max:97.4 °F (36.3 °C)       Intake/Output:   Last shift:         Last 3 shifts:    No intake or output data in the 24 hours ending 17 1554      Ventilator Settings:  Mode Rate Tidal Volume Pressure FiO2 PEEP   Assist control   450 ml    50 % 5 cm H20     Peak airway pressure: 28 cm H2O    Minute ventilation: 8 l/min      ARDS network Guidelines: Lung protective strategy and Pl pressure goals-<30    Physical Exam:    General:  Intubated, sedated, ETT orally   Head:  Normocephalic, without obvious abnormality, atraumatic. Eyes:  Conjunctivae/corneas clear. PERRL, EOMs intact. Nose: Nares normal. Septum midline. Mucosa normal. No drainage or sinus tenderness. Throat: Lips, mucosa, ETT   Neck: Supple, symmetrical, trachea midline, no adenopathy, thyroid: no enlargment/tenderness/nodules, no carotid bruit and no JVD. Back:   Symmetric, no curvature.  ROM normal.   Lungs: Bilateral crackles   Chest wall:  No tenderness or deformity. Heart:  Regular rate and rhythm, S1, S2 normal, no murmur, click, rub or gallop. Abdomen:   Soft, non-tender. Bowel sounds normal. No masses,  No organomegaly. Extremities: Extremities normal, atraumatic, no cyanosis or edema. Pulses: 2+ and symmetric all extremities. Skin: Skin color, texture, turgor normal. No rashes or lesions   Lymph nodes:      Cervical, supraclavicular, and axillary nodes normal.   Neurologic: Grossly nonfocal       Data:     Recent Results (from the past 24 hour(s))   URINALYSIS W/ RFLX MICROSCOPIC    Collection Time: 01/16/17 11:45 AM   Result Value Ref Range    Color YELLOW      Appearance CLEAR      Specific gravity 1.016 1.005 - 1.030      pH (UA) 6.0 5.0 - 8.0      Protein NEGATIVE  NEG mg/dL    Glucose NEGATIVE  NEG mg/dL    Ketone NEGATIVE  NEG mg/dL    Bilirubin NEGATIVE  NEG      Blood NEGATIVE  NEG      Urobilinogen 0.2 0.2 - 1.0 EU/dL    Nitrites NEGATIVE  NEG      Leukocyte Esterase MODERATE (A) NEG     URINE MICROSCOPIC ONLY    Collection Time: 01/16/17 11:45 AM   Result Value Ref Range    WBC 2 to 4 0 - 5 /hpf    RBC 0 0 - 5 /hpf    Bacteria FEW (A) NEG /hpf   INFLUENZA A & B AG (RAPID TEST)    Collection Time: 01/16/17 12:33 PM   Result Value Ref Range    Influenza A Antigen NEGATIVE  NEG      Influenza B Antigen NEGATIVE  NEG     CBC WITH AUTOMATED DIFF    Collection Time: 01/16/17 12:40 PM   Result Value Ref Range    WBC 7.1 4.6 - 13.2 K/uL    RBC 4.51 4.20 - 5.30 M/uL    HGB 14.2 12.0 - 16.0 g/dL    HCT 43.6 35.0 - 45.0 %    MCV 96.7 74.0 - 97.0 FL    MCH 31.5 24.0 - 34.0 PG    MCHC 32.6 31.0 - 37.0 g/dL    RDW 12.6 11.6 - 14.5 %    PLATELET 821 636 - 687 K/uL    MPV 10.7 9.2 - 11.8 FL    NEUTROPHILS 70 40 - 73 %    LYMPHOCYTES 21 21 - 52 %    MONOCYTES 5 3 - 10 %    EOSINOPHILS 4 0 - 5 %    BASOPHILS 0 0 - 2 %    ABS. NEUTROPHILS 4.9 1.8 - 8.0 K/UL    ABS. LYMPHOCYTES 1.5 0.9 - 3.6 K/UL    ABS. MONOCYTES 0.4 0.05 - 1.2 K/UL    ABS. EOSINOPHILS 0.3 0.0 - 0.4 K/UL    ABS. BASOPHILS 0.0 0.0 - 0.06 K/UL    DF AUTOMATED     METABOLIC PANEL, COMPREHENSIVE    Collection Time: 01/16/17 12:40 PM   Result Value Ref Range    Sodium 139 136 - 145 mmol/L    Potassium 4.5 3.5 - 5.5 mmol/L    Chloride 102 100 - 108 mmol/L    CO2 28 21 - 32 mmol/L    Anion gap 9 3.0 - 18 mmol/L    Glucose 156 (H) 74 - 99 mg/dL    BUN 14 7.0 - 18 MG/DL    Creatinine 1.07 0.6 - 1.3 MG/DL    BUN/Creatinine ratio 13 12 - 20      GFR est AA 59 (L) >60 ml/min/1.73m2    GFR est non-AA 49 (L) >60 ml/min/1.73m2    Calcium 10.1 8.5 - 10.1 MG/DL    Bilirubin, total 0.3 0.2 - 1.0 MG/DL    ALT 11 (L) 13 - 56 U/L    AST 4 (L) 15 - 37 U/L    Alk.  phosphatase 115 45 - 117 U/L    Protein, total 7.7 6.4 - 8.2 g/dL    Albumin 3.5 3.4 - 5.0 g/dL    Globulin 4.2 (H) 2.0 - 4.0 g/dL    A-G Ratio 0.8 0.8 - 1.7     LIPASE    Collection Time: 01/16/17 12:40 PM   Result Value Ref Range    Lipase 205 73 - 393 U/L   LACTIC ACID, PLASMA    Collection Time: 01/16/17 12:40 PM   Result Value Ref Range    Lactic acid 1.6 0.4 - 2.0 MMOL/L   CARDIAC PANEL,(CK, CKMB & TROPONIN)    Collection Time: 01/16/17 12:40 PM   Result Value Ref Range    CK 56 26 - 192 U/L    CK - MB 1.4 0.5 - 3.6 ng/ml    CK-MB Index 2.5 0.0 - 4.0 %    Troponin-I, Qt. 0.02 0.00 - 0.06 NG/ML   PRO-BNP    Collection Time: 01/16/17 12:40 PM   Result Value Ref Range    NT pro-BNP 1958 (H) 0 - 1800 PG/ML   PROTHROMBIN TIME + INR    Collection Time: 01/16/17 12:40 PM   Result Value Ref Range    Prothrombin time 11.2 (L) 11.5 - 15.2 sec    INR 0.8 0.8 - 1.2     PTT    Collection Time: 01/16/17 12:40 PM   Result Value Ref Range    aPTT 26.6 23.0 - 36.4 SEC   EKG, 12 LEAD, INITIAL    Collection Time: 01/16/17 12:55 PM   Result Value Ref Range    Ventricular Rate 69 BPM    Atrial Rate 69 BPM    P-R Interval 242 ms    QRS Duration 130 ms    Q-T Interval 448 ms    QTC Calculation (Bezet) 480 ms    Calculated P Axis 45 degrees    Calculated R Axis -33 degrees    Calculated T Axis 83 degrees    Diagnosis       Sinus rhythm with 1st degree AV block  Left axis deviation  Left bundle branch block  Abnormal ECG  When compared with ECG of 01-SEP-2015 07:26,  Left bundle branch block is now present  Criteria for Septal infarct are no longer present     EKG, 12 LEAD, SUBSEQUENT    Collection Time: 01/16/17  1:48 PM   Result Value Ref Range    Ventricular Rate 113 BPM    Atrial Rate 60 BPM    QRS Duration 118 ms    Q-T Interval 344 ms    QTC Calculation (Bezet) 471 ms    Calculated R Axis -11 degrees    Calculated T Axis 101 degrees    Diagnosis       Atrial fibrillation with rapid ventricular response  Incomplete left bundle branch block  ST & T wave abnormality, consider lateral ischemia or digitalis effect  Abnormal ECG  When compared with ECG of 16-JAN-2017 12:55,  Atrial fibrillation has replaced Sinus rhythm  Vent. rate has increased BY  44 BPM  ST now depressed in Anterior leads  T wave inversion more evident in Lateral leads     POC G3    Collection Time: 01/16/17  2:48 PM   Result Value Ref Range    Device: VENT      FIO2 (POC) 100 %    pH (POC) 7.242 (LL) 7.35 - 7.45      pCO2 (POC) 46.3 (H) 35.0 - 45.0 MMHG    pO2 (POC) 258 (H) 80 - 100 MMHG    HCO3 (POC) 19.9 (L) 22 - 26 MMOL/L    sO2 (POC) 100 (H) 92 - 97 %    Base deficit (POC) 7 mmol/L    Mode ASSIST CONTROL      Tidal volume 450 ml    Set Rate 14 bpm    PEEP/CPAP (POC) 5 cmH2O    Allens test (POC) YES      Inspiratory Time 0.9 sec    Total resp. rate 14      Site RIGHT RADIAL      Specimen type (POC) ARTERIAL      Performed by Karolyn Snyder     Volume control YES             Recent Labs      01/16/17   1448   FIO2I  100   HCO3I  19.9*   PCO2I  46.3*   PHI  7.242*   PO2I  258*     Telemetry:AFIB    Imaging:  I have personally reviewed the patients radiographs and have reviewed the reports:  CXR: 1/16/17:  1. Tubes appear in satisfactory position.  No pneumothorax. 2. Interstitial changes could represent developing pulmonary edema but actually  appear improved from prior. 3. Minor atelectasis in both lungs, worse than prior. Best practice :  Core measures:    Glycemic control  Mech. Ventilated patients- aim to keep peak plateau pressure 35-26BV H2O.   Sress ulcer prophylaxis  DVT prophylaxis       Vent Bundle Followed, Vent Day 1       Total critical care time exclusive of procedures: 50 minutes  Ramy Munguia MD

## 2017-01-16 NOTE — ED NOTES
Consulted w/ uSmeet Nieves MD. MD made aware of pt continuing to fight the ventilator w/ increasing pressure of 145/114. MD  aware pt has not been to CT at this time due to pt's fighting ventilator. MD gives orders to start propofol drip and maintain versed drip at 2mg/ hr at this time.

## 2017-01-16 NOTE — PROGRESS NOTES
Father Shyanne Gilliland visited with   Neil Mtz, who is a 80 y.o.,female. Father Shyanne Gilliland provided [employer]. Chaplains will continue to follow and will provide pastoral care on an as needed/requested basis.  recommends bedside caregivers page  on duty if patient shows signs of acute spiritual or emotional distress.      Fr Chaz Mckeon, [id number] [facility] - Office

## 2017-01-16 NOTE — ED NOTES
Pt w/ HR of 45 and BP of 67/30. Propofol and Versed drips stopped. EKG performed and Dr. Vicente Mcconnell to bedside. RT also at bedside.

## 2017-01-16 NOTE — ED NOTES
Pt w/ increased alertness. Pt remains in soft restraints for pt safety. Family remains at bedside.  Attempt to call admitting MD.

## 2017-01-16 NOTE — ED NOTES
Patient's friend called on call bell stating patient's blood pressure had increased. Patient's blood pressure 206/108. Patient complaining of chest pain. Spoke with ELIJAH Rojas, orders received for subsequent EKG. Patient continuing to complain of chest pain and with increased anxiety.

## 2017-01-16 NOTE — ED NOTES
Pt w/ BP of 67/42. MD made aware and verbal order for 500 mL bolus of normal saline given. Fluids started.

## 2017-01-16 NOTE — ED PROVIDER NOTES
HPI Comments:   12:00 PM  Saintclair Headings is a 80 y.o. female with a PMHx of DM, CAD, and HLD presenting to the ED C/O dizziness, abdominal pain, and headache onset last night. Associated sx include periumbilical abd pain at an 8/10, dry mouth, hallucinations (saw her \"neighbor's dog in the room\"), diarrhea (soft stool), rhinorrhea, and nausea. Pt denies eating any suspicious food or sick contacts recently. Pt has not taken an abx pill recently. Pt has not received her flu shot recently. Pt has a PCP Dr. Cristina Calderon. Pt reports an abd hernia with more pain at this site than usual. Pt has a PMHx of A-FIB, MI, UTI, and neuropathy. Pt reports allergies to Excedrin and Zantac. PSHx of hysterectomy, cholecystectomy, breast lumpectomy, and coronary stent placement. Pt reports some anxiety with thoughts of CT scanning or other small spaces. Pt is a former smoker and denies EtOH and recreational drug use. Pt denies fever, sore throat, chest pain, SOB, cough, vomiting, change in appetite, dysuria, and any other associated signs and sx. Patient is a 80 y.o. female presenting with diarrhea. The history is provided by the patient. No  was used. Diarrhea    This is a new problem. The current episode started yesterday. The problem has not changed since onset. The pain is at a severity of 8/10. Associated symptoms include diarrhea, nausea and headaches. Pertinent negatives include no fever, no vomiting, no dysuria, no frequency, no hematuria, no arthralgias, no chest pain and no back pain.         Past Medical History:   Diagnosis Date    A-fib [facility])     CAD (coronary artery disease)     [facility]     High cholesterol     MI (myocardial infarction) ([facility]     Neuropathy     UTI (urinary tract infection)        Past Surgical History:   Procedure Laterality Date    Hx hysterectomy      Hx cholecystectomy      Hx breast lumpectomy      Hx coronary stent placement           History reviewed. No pertinent family history. Social History     Social History    Marital status:      Spouse name: N/A    Number of children: N/A    Years of education: N/A     Occupational History    Not on file. Social History Main Topics    Smoking status: Former Smoker    Smokeless tobacco: Not on file    Alcohol use No    Drug use: No    Sexual activity: Not on file     Other Topics Concern    Not on file     Social History Narrative         ALLERGIES: Excedrin [acetaminophen-caffeine] and Zantac [ranitidine hcl]    Review of Systems   Constitutional: Negative for appetite change, chills and fever. HENT: Positive for rhinorrhea. Negative for congestion and sore throat. (-) Dry mouth   Respiratory: Negative for cough and shortness of breath. Cardiovascular: Negative for chest pain and leg swelling. Gastrointestinal: Positive for abdominal pain (periumbilical), diarrhea and nausea. Negative for abdominal distention and vomiting. Genitourinary: Negative for difficulty urinating, dysuria, flank pain, frequency, hematuria and urgency. Musculoskeletal: Negative for arthralgias, back pain and joint swelling. Skin: Negative for rash. Allergic/Immunologic: Negative for immunocompromised state. Neurological: Positive for dizziness and headaches. Negative for weakness and light-headedness. Hematological: Negative for adenopathy. Psychiatric/Behavioral: Positive for hallucinations. The patient is nervous/anxious. Vitals:    01/16/17 1545 01/16/17 1600 01/16/17 1615 01/16/17 1630   BP: 115/76 135/81 (!) 124/93 138/79   Pulse: 75 76 73 67   Resp: 15 17 16 18   Temp:       SpO2: 100% 97% 100% 100%   Weight:       Height:                Physical Exam   Constitutional: She is oriented to person, place, and time. She appears well-developed and well-nourished. No distress. AA female in NAD. Appears anxious. Sitting in ED chair. Appears anxious. Friend at bedside.     HENT: Head: Normocephalic and atraumatic. Right Ear: External ear normal. No swelling or tenderness. Tympanic membrane is not perforated, not erythematous and not bulging. Left Ear: External ear normal. No swelling or tenderness. Tympanic membrane is not perforated, not erythematous and not bulging. Nose: Mucosal edema and rhinorrhea present. Right sinus exhibits no maxillary sinus tenderness and no frontal sinus tenderness. Left sinus exhibits no maxillary sinus tenderness and no frontal sinus tenderness. Mouth/Throat: Uvula is midline, oropharynx is clear and moist and mucous membranes are normal. No oral lesions. No trismus in the jaw. No dental abscesses or uvula swelling. No oropharyngeal exudate, posterior oropharyngeal edema, posterior oropharyngeal erythema or tonsillar abscesses. Eyes: Conjunctivae are normal. Right eye exhibits no discharge. Left eye exhibits no discharge. No scleral icterus. Neck: Normal range of motion. Neck supple. Cardiovascular: Normal rate, regular rhythm, normal heart sounds and intact distal pulses. Exam reveals no gallop and no friction rub. No murmur heard. Pulmonary/Chest: Effort normal and breath sounds normal. No accessory muscle usage. No tachypnea. No respiratory distress. She has no decreased breath sounds. She has no wheezes. She has no rhonchi. She has no rales. Abdominal: Soft. Bowel sounds are normal. She exhibits shifting dullness. She exhibits no distension, no ascites and no mass. There is tenderness in the periumbilical area. There is no rigidity, no rebound, no guarding, no tenderness at McBurney's point and negative Sullivan's sign. A hernia is present. Hernia confirmed positive in the ventral area. Obese abdomen     Genitourinary: Rectal exam shows guaiac positive stool (weakly positive with black stool).  Rectal exam shows no external hemorrhoid, no fissure, no mass, no tenderness and anal tone normal.   Musculoskeletal: Normal range of motion. Lymphadenopathy:     She has no cervical adenopathy. Neurological: She is alert and oriented to person, place, and time. Skin: Skin is warm and dry. No rash noted. She is not diaphoretic. No erythema. Psychiatric: Judgment normal. Her mood appears anxious. Nursing note and vitals reviewed. RESULTS:  EKG interpretation: (Preliminary)  12:55 PM   Sinus rhythm with 1st degree AV Block at 69 bpm. Left axis deviation. Left BBB. No ST Elevation. Abnormal EKG. EKG read by Migdalia Levin PA-C     EKG interpretation: (Secondary)  1:48 PM   Atrial Fibrillation with rapid ventricular response at 113 bpm. Incomplete left BBB. ST& T wave abnormality, consider lateral ischemia or digitalis effect. Abnormal EKG. EKG read by Ana East MD    EKG interpretation: Flakito Collazo)  1:57 PM   Atrial fibrillation with rapid ventricular response with premature ventricular or aberrantly conducted complexes. Left BBB. No change from Previous. EKG read by Migdalia Levin PA-C     EKG interpretation: Ravi Farris)  2:04 PM   Atrial Fibrillation with rapid ventricular response with premature ventricular or aberrantly conducted complexes. Left BBB. Abnormal EKG. EKG read by Ana East MD    EKG interpretation: Claudia Lovell)  2:06 PM   Wide QRS rhythm. Left BBB. Abnormal EKG. EKG read by Ana East MD    EKG interpretation: Enma Wise)  2:16 PM   NSR with sinus arrhythmia. Rate 78 bpm. Left BBB. Prior EKG shows out of A-fib.    EKG read by Ana East MD     XR CHEST PORT   Final Result      CT ABD PELV W CONT    (Results Pending)   CTA CHEST W WO CONT    (Results Pending)   XR CHEST PORT    (Results Pending)   XR CHEST PORT    (Results Pending)   XR CHEST PORT    (Results Pending)        Labs Reviewed   URINALYSIS W/ RFLX MICROSCOPIC - Abnormal; Notable for the following:        Result Value    Leukocyte Esterase MODERATE (*)     All other components within normal limits   METABOLIC PANEL, COMPREHENSIVE - Abnormal; Notable for the following:     Glucose 156 (*)     GFR est AA 59 (*)     GFR est non-AA 49 (*)     ALT 11 (*)     AST 4 (*)     Globulin 4.2 (*)     All other components within normal limits   URINE MICROSCOPIC ONLY - Abnormal; Notable for the following:     Bacteria FEW (*)     All other components within normal limits   PRO-BNP - Abnormal; Notable for the following:     NT pro-BNP 1958 (*)     All other components within normal limits   PROTHROMBIN TIME + INR - Abnormal; Notable for the following:     Prothrombin time 11.2 (*)     All other components within normal limits   HEMOGLOBIN A1C WITH EAG - Abnormal; Notable for the following:     Hemoglobin A1c 7.2 (*)     All other components within normal limits   POC G3 - Abnormal; Notable for the following:     pH (POC) 7.242 (*)     pCO2 (POC) 46.3 (*)     pO2 (POC) 258 (*)     HCO3 (POC) 19.9 (*)     sO2 (POC) 100 (*)     All other components within normal limits   INFLUENZA A & B AG (RAPID TEST)   C. DIFFICILE/EPI PCR   CBC WITH AUTOMATED DIFF   LIPASE   LACTIC ACID, PLASMA   CARDIAC PANEL,(CK, CKMB & TROPONIN)   PTT   BLOOD GAS, ARTERIAL   TYPE & SCREEN       Recent Results (from the past 12 hour(s))   URINALYSIS W/ RFLX MICROSCOPIC    Collection Time: 01/16/17 11:45 AM   Result Value Ref Range    Color YELLOW      Appearance CLEAR      Specific gravity 1.016 1.005 - 1.030      pH (UA) 6.0 5.0 - 8.0      Protein NEGATIVE  NEG mg/dL    Glucose NEGATIVE  NEG mg/dL    Ketone NEGATIVE  NEG mg/dL    Bilirubin NEGATIVE  NEG      Blood NEGATIVE  NEG      Urobilinogen 0.2 0.2 - 1.0 EU/dL    Nitrites NEGATIVE  NEG      Leukocyte Esterase MODERATE (A) NEG     URINE MICROSCOPIC ONLY    Collection Time: 01/16/17 11:45 AM   Result Value Ref Range    WBC 2 to 4 0 - 5 /hpf    RBC 0 0 - 5 /hpf    Bacteria FEW (A) NEG /hpf   INFLUENZA A & B AG (RAPID TEST)    Collection Time: 01/16/17 12:33 PM   Result Value Ref Range    Influenza A Antigen NEGATIVE  NEG Influenza B Antigen NEGATIVE  NEG     CBC WITH AUTOMATED DIFF    Collection Time: 01/16/17 12:40 PM   Result Value Ref Range    WBC 7.1 4.6 - 13.2 K/uL    RBC 4.51 4.20 - 5.30 M/uL    HGB 14.2 12.0 - 16.0 g/dL    HCT 43.6 35.0 - 45.0 %    MCV 96.7 74.0 - 97.0 FL    MCH 31.5 24.0 - 34.0 PG    MCHC 32.6 31.0 - 37.0 g/dL    RDW 12.6 11.6 - 14.5 %    PLATELET 544 115 - 932 K/uL    MPV 10.7 9.2 - 11.8 FL    NEUTROPHILS 70 40 - 73 %    LYMPHOCYTES 21 21 - 52 %    MONOCYTES 5 3 - 10 %    EOSINOPHILS 4 0 - 5 %    BASOPHILS 0 0 - 2 %    ABS. NEUTROPHILS 4.9 1.8 - 8.0 K/UL    ABS. LYMPHOCYTES 1.5 0.9 - 3.6 K/UL    ABS. MONOCYTES 0.4 0.05 - 1.2 K/UL    ABS. EOSINOPHILS 0.3 0.0 - 0.4 K/UL    ABS. BASOPHILS 0.0 0.0 - 0.06 K/UL    DF AUTOMATED     METABOLIC PANEL, COMPREHENSIVE    Collection Time: 01/16/17 12:40 PM   Result Value Ref Range    Sodium 139 136 - 145 mmol/L    Potassium 4.5 3.5 - 5.5 mmol/L    Chloride 102 100 - 108 mmol/L    CO2 28 21 - 32 mmol/L    Anion gap 9 3.0 - 18 mmol/L    Glucose 156 (H) 74 - 99 mg/dL    BUN 14 7.0 - 18 MG/DL    Creatinine 1.07 0.6 - 1.3 MG/DL    BUN/Creatinine ratio 13 12 - 20      GFR est AA 59 (L) >60 ml/min/1.73m2    GFR est non-AA 49 (L) >60 ml/min/1.73m2    Calcium 10.1 8.5 - 10.1 MG/DL    Bilirubin, total 0.3 0.2 - 1.0 MG/DL    ALT 11 (L) 13 - 56 U/L    AST 4 (L) 15 - 37 U/L    Alk.  phosphatase 115 45 - 117 U/L    Protein, total 7.7 6.4 - 8.2 g/dL    Albumin 3.5 3.4 - 5.0 g/dL    Globulin 4.2 (H) 2.0 - 4.0 g/dL    A-G Ratio 0.8 0.8 - 1.7     LIPASE    Collection Time: 01/16/17 12:40 PM   Result Value Ref Range    Lipase 205 73 - 393 U/L   LACTIC ACID, PLASMA    Collection Time: 01/16/17 12:40 PM   Result Value Ref Range    Lactic acid 1.6 0.4 - 2.0 MMOL/L   CARDIAC PANEL,(CK, CKMB & TROPONIN)    Collection Time: 01/16/17 12:40 PM   Result Value Ref Range    CK 56 26 - 192 U/L    CK - MB 1.4 0.5 - 3.6 ng/ml    CK-MB Index 2.5 0.0 - 4.0 %    Troponin-I, Qt. 0.02 0.00 - 0.06 NG/ML PRO-BNP    Collection Time: 01/16/17 12:40 PM   Result Value Ref Range    NT pro-BNP 1958 (H) 0 - 1800 PG/ML   PROTHROMBIN TIME + INR    Collection Time: 01/16/17 12:40 PM   Result Value Ref Range    Prothrombin time 11.2 (L) 11.5 - 15.2 sec    INR 0.8 0.8 - 1.2     PTT    Collection Time: 01/16/17 12:40 PM   Result Value Ref Range    aPTT 26.6 23.0 - 36.4 SEC   HEMOGLOBIN A1C WITH EAG    Collection Time: 01/16/17 12:40 PM   Result Value Ref Range    Hemoglobin A1c 7.2 (H) 4.5 - 5.6 %    Est. average glucose 160 mg/dL   EKG, 12 LEAD, INITIAL    Collection Time: 01/16/17 12:55 PM   Result Value Ref Range    Ventricular Rate 69 BPM    Atrial Rate 69 BPM    P-R Interval 242 ms    QRS Duration 130 ms    Q-T Interval 448 ms    QTC Calculation (Bezet) 480 ms    Calculated P Axis 45 degrees    Calculated R Axis -33 degrees    Calculated T Axis 83 degrees    Diagnosis       Sinus rhythm with 1st degree AV block  Left axis deviation  Left bundle branch block  Abnormal ECG  When compared with ECG of 01-SEP-2015 07:26,  Left bundle branch block is now present  Criteria for Septal infarct are no longer present     EKG, 12 LEAD, SUBSEQUENT    Collection Time: 01/16/17  1:48 PM   Result Value Ref Range    Ventricular Rate 113 BPM    Atrial Rate 60 BPM    QRS Duration 118 ms    Q-T Interval 344 ms    QTC Calculation (Bezet) 471 ms    Calculated R Axis -11 degrees    Calculated T Axis 101 degrees    Diagnosis       Atrial fibrillation with rapid ventricular response  Incomplete left bundle branch block  ST & T wave abnormality, consider lateral ischemia or digitalis effect  Abnormal ECG  When compared with ECG of 16-JAN-2017 12:55,  Atrial fibrillation has replaced Sinus rhythm  Vent.  rate has increased BY  44 BPM  ST now depressed in Anterior leads  T wave inversion more evident in Lateral leads     POC G3    Collection Time: 01/16/17  2:48 PM   Result Value Ref Range    Device: VENT      FIO2 (POC) 100 %    pH (POC) 7.242 (LL) 7.35 - 7.45      pCO2 (POC) 46.3 (H) 35.0 - 45.0 MMHG    pO2 (POC) 258 (H) 80 - 100 MMHG    HCO3 (POC) 19.9 (L) 22 - 26 MMOL/L    sO2 (POC) 100 (H) 92 - 97 %    Base deficit (POC) 7 mmol/L    Mode ASSIST CONTROL      Tidal volume 450 ml    Set Rate 14 bpm    PEEP/CPAP (POC) 5 cmH2O    Allens test (POC) YES      Inspiratory Time 0.9 sec    Total resp. rate 14      Site RIGHT RADIAL      Specimen type (POC) ARTERIAL      Performed by Vlad Choudhary     Volume control YES          MDM  Number of Diagnoses or Management Options  Acute respiratory failure with hypoxia [facility]):   Atrial fibrillation with RVR [facility]):   Diagnosis management comments: Colitis, diverticulitis, pancreatitis, hepatitis, gastroenteritis, gastritis, PUD, GERD, viral, food borne, UTI. Doubt AAA. Doubt atypical cardiac.         Amount and/or Complexity of Data Reviewed  Clinical lab tests: reviewed and ordered  Tests in the radiology section of CPT®: reviewed and ordered (CT Abd Pelv w contrast)  Tests in the medicine section of CPT®: reviewed and ordered (EKG)  Discuss the patient with other providers: yes Zain Padilla MD (ED Attending)  MD Esther Schuster MD (Internal Medicine))  Independent visualization of images, tracings, or specimens: yes (EKG)    Critical Care  Total time providing critical care:  minutes (77 minutes)    ED Course     Medications   dilTIAZem (CARDIZEM) 5 mg/mL injection (0 mg  Held 1/16/17 1404)   midazolam (VERSED) 100 mg in 0.9% sodium chloride 100 mL infusion (2 mg/hr IntraVENous Rate Change 1/16/17 1552)   hydrALAZINE (APRESOLINE) 20 mg/mL injection 20 mg (0 mg IntraVENous Held 1/16/17 1516)   dilTIAZem (CARDIZEM) 100 mg in 0.9% sodium chloride (MBP/ADV) 100 mL infusion (not administered)   sotalol (BETAPACE) tablet 80 mg (not administered)   insulin lispro (HUMALOG) injection (not administered)   glucose chewable tablet 16 g (not administered)   glucagon (GLUCAGEN) injection 1 mg (not administered) dextrose (D50W) injection syrg 12.5-25 g (not administered)   pantoprazole (PROTONIX) 40 mg in sodium chloride 0.9 % 10 mL injection (not administered)   enoxaparin (LOVENOX) injection 40 mg (not administered)   furosemide (LASIX) injection 40 mg (not administered)   ELECTROLYTE REPLACEMENT PROTOCOL (not administered)   ELECTROLYTE REPLACEMENT PROTOCOL (not administered)   midazolam (VERSED) injection 1 mg (not administered)   fentaNYL citrate (PF) injection 25 mcg (not administered)   chlorhexidine (PERIDEX) 0.12 % mouthwash 10 mL (not administered)   albuterol-ipratropium (DUO-NEB) 2.5 MG-0.5 MG/3 ML (not administered)   midazolam (VERSED) injection 2 mg (2 mg IntraVENous Incomplete 1/16/17 1656)   0.9% sodium chloride infusion 1,000 mL (0 mL IntraVENous IV Completed 1/16/17 1501)   ondansetron (ZOFRAN) injection 4 mg (4 mg IntraVENous Given 1/16/17 1253)   furosemide (LASIX) injection 80 mg (80 mg IntraVENous Given 1/16/17 1405)   rocuronium (ZEMURON) injection 50 mg (50 mg IntraVENous Given 1/16/17 1354)   etomidate (AMIDATE) 2 mg/mL injection 20 mg (20 mg IntraVENous Given 1/16/17 1353)   midazolam (VERSED) injection 3 mg (3 mg IntraVENous Given 1/16/17 1508)   0.9% sodium chloride infusion 500 mL (500 mL IntraVENous New Bag 1/16/17 1520)   midazolam (VERSED) injection 1 mg (1 mg IntraVENous Given 1/16/17 1607)       Cardioversion, electrical  Date/Time: 1/16/2017 1:54 PM  Performed by: Ananya Childress  Authorized by: Ananya Childress     Consent:     Consent obtained:  Emergent situation  Pre-procedure details:     Cardioversion basis:  Emergent    Rhythm:  Atrial fibrillation  Attempt one:     Cardioversion mode:  Synchronous    Waveform:  Monophasic    Shock (Joules):  150    Shock outcome:  No change in rhythm  Post-procedure details:     Post-procedure mental status: Intubated prior to procedure. Patient tolerance of procedure:   Tolerated well, no immediate complications  Intubation  Date/Time: 1/16/2017 1:54 PM  Performed by: Keanu Rodríguez  Authorized by: Keanu Rodríguez     Consent:     Consent obtained:  Emergent situation  Pre-procedure details:     Patient status:  Unresponsive    Pretreatment meds: Etomidate. Paralytics:  Rocuronium  Procedure details:     Preoxygenation:  Bag valve mask    CPR in progress: no      Intubation method:  Oral    Oral intubation technique:  Video-assisted    Laryngoscope blade: Mac 3    Tube size (mm):  7.5    Number of attempts:  1    Tube visualized through cords: yes    Placement assessment:     ETT to lip:  23    Tube secured with:  ETT villegas and adhesive tape    Breath sounds:  Absent over the epigastrium and equal    Placement verification: CXR verification    Post-procedure details:     Patient tolerance of procedure: Tolerated well, no immediate complications        PROGRESS NOTE:   12:00 PM  Initial assessment completed. Written by Tim Martinez, ED Scribe, as dictated by Erika Martin PA-C. PROGRESS NOTE:   Per RN, Pt became more anxious and began to complain of more CP and SOB. I immediately ordered a 2nd EKG. Pt began to have problems breathing. I was at bedside. Was speaking in phrases. Tachypnea respiratory distress. Sats dropped to 40%. She exit her room to alert my attending, after which she coded. We immediately were at bedside with attending and started ACLS. CPR Preformed by Yuliya Mccallum MD, and [facility]. 1:42 PM -2nd EKG was ordered prior to my arrival to bedside and prior to code. Pt sounded like she was going into failure, per nurse. Looking for non-rebreather, but before obtaining it, code blue was called. Pt came back with heart rate at 1:44 PM.   1:49 PM - IV line was started by nurses. 1:52 PM - Pt is stated to be in AFIB at 148 bpm. Plan to sedate and shock. 1:53 PM - Etomidate administered. 1:54 PM - Josiah administered. 1:54 PM - Intubation in progress by Idalmis Tejada.   1:55 PM - EKG being run to confirm AFIB.  1:59 PM - EKG confirms AFIB. Pads attached. 2:00 PM - First shock administered at 150 units with HR at 138.  2:03 PM - Cardizem 20 mg IV    FACE-TO-FACE PROGRESS NOTE:  2:20 PM   I was called to see the patient at 1340 as she developed respiratory distress leading to cardiac arrest. CPR was directed by me which had the result of MURIEL. Pt was disoriented, combative, and still hypoxic, so I elected to intubate the patient via RSI. An EKG was done which showed A-fib with RVR. I elected to cardiovert. This had the result of slowing down the rate, but she was still in A-fib. I then ordered Cardizem and this had the result of returning the rhythm to NSR rate of 90s. My clinical impression is that the patient had developed flash pulmonary edema as a result of the combination of fluids (which was appropriate for her presentation) hypertension, and the development of A-fib with RVR. She has now been stabilized and our plan is to continue with obtaining the CT scan, diurese the patient, and admit her to the unit. Written by CESILIA Mosquera, as dictated by Donte Zhang MD.    CONSULT NOTE:   4:35 PM  Donte Zhang MD spoke with Rito Camp MD   Specialty: Internal Medicine  Discussed pt's hx, disposition, and available diagnostic and imaging results over the telephone. Reviewed care plans. Consulting physician agrees with plans as outlined. She is aware that pt will be admitted to ICU. .   Written by CESILIA Terrell, as dictated by Donte Zhang MD.     CONSULT NOTE:   2:58 PM  Donte Zhang MD spoke with Lyubov Mathur MD   Specialty: Pulmonologist  Discussed pt's hx over the telephone and explained the pt's presentation and development of probable pulmonary edema and her subsequent cardiac arrest. She will come down and see the patient.    Written by CESILIA Mosquera, as dictated by Donte Zhang MD.     CONSULT NOTE:   3:48 PM  Nichelle Lozano PA-C spoke with Eladio Neves MD  Specialty: ED Attending  Discussed pt's hx, disposition, and available diagnostic and imaging results in person. Reviewed care plans. Consulting physician agrees with plans as outlined. Martín Monge states that pt had Acute pulmonary edema,  Acute AFIB with RVR. Written by Anni Patel ED Scribe, as dictated by Nichelle Lozano PA-C.     ADMISSION NOTE:  4:36 PM  Patient is being admitted to the hospital by Sander Wilks MD. The results of their tests and reasons for their admission have been discussed with them and/or available family. They convey agreement and understanding for the need to be admitted and for their admission diagnosis. Written by Anni Patel ED Scrcecilio, as dictated by Eladio Neves MD.    PROGRESS NOTE:   4:54 PM  Upon awaiting admission, pt was starting to become conscious and had blood pressure spiking. Not properly sedated with the Versad order from [address] [place] [address]. per Dr. Martín Monge, will give another 2 mg dose now. Order has been placed. Written by Anni Patel ED Scribzaid, as dictated by Nichelle Lozano PA-C.     CONDITIONS ON ADMISSION:  Deep Vein Thrombosis is not present at the time of admission. Thrombosis is not present at the time of admission. Urinary Tract Infection is not present at the time of admission. Pneumonia is not present at the time of admission. MRSA is not present at the time of admission. Wound infection is not present at the time of admission. Pressure Ulcer is not present at the time of admission. CLINICAL IMPRESSION    1. Acute respiratory failure with hypoxia ([address]    2. Atrial fibrillation with RVR ([address]         2:43 PM  I have spent 77 minutes of total critical care time involved in lab review, consultations with specialist, family decision-making, and documentation. During this entire length of time I was immediately available to the patient. Critical Care:   The reason for providing this level of medical care for this critically ill patient was due a critical illness that impaired one or more vital organ systems such that there was a high probability of imminent or life threatening deterioration in the patients condition. This care involved high complexity decision making to assess, manipulate, and support vital system functions, to treat this degreee vital organ system failure and to prevent further life threatening deterioration of the patients condition. Mitzi Arechiga M.D. And Neeta Gandhi PA-C    Attestations: This note is prepared by Patrick Cai, acting as Scribe for Neeta Gandhi PA-C. Neeta Gandhi PA-C: The scribe's documentation has been prepared under my direction and personally reviewed by me in its entirety. I confirm that the note above accurately reflects all work, treatment, procedures, and medical decision making performed by me.     4:38 PM  CRITICAL CARE NOTE:    I have spent 40 minutes of critical care time involved in lab review, consultations with specialist, family decision-making, documentation, and repeated beside evaluations. During this entire length of time I was focused on the care to this patient by being immediately available to the patient. The reason for providing this level of medical care for this critically ill patient was due a critical illness or injury that impaired one or more vital organ systems such that there was a high probability of imminent or life threatening deterioration in the patients condition. This care involved high complexity decision making to assess, manipulate, and support vital system functions, to treat this degreee vital organ system failure and to prevent further life threatening deterioration of the patients condition. The specifics regarding the actions taken and the patient's response are noted within the medical record.     Mitzi Arechiga M.D.

## 2017-01-17 ENCOUNTER — APPOINTMENT (OUTPATIENT)
Dept: CT IMAGING | Age: 82
DRG: 208 | End: 2017-01-17
Attending: HOSPITALIST
Payer: MEDICARE

## 2017-01-17 ENCOUNTER — APPOINTMENT (OUTPATIENT)
Dept: GENERAL RADIOLOGY | Age: 82
DRG: 208 | End: 2017-01-17
Attending: INTERNAL MEDICINE
Payer: MEDICARE

## 2017-01-17 LAB
ANION GAP BLD CALC-SCNC: 12 MMOL/L (ref 3–18)
ARTERIAL PATENCY WRIST A: YES
ATRIAL RATE: 129 BPM
ATRIAL RATE: 141 BPM
ATRIAL RATE: 60 BPM
ATRIAL RATE: 61 BPM
ATRIAL RATE: 69 BPM
ATRIAL RATE: 78 BPM
ATRIAL RATE: 96 BPM
BASE DEFICIT BLD-SCNC: 4 MMOL/L
BASOPHILS # BLD AUTO: 0 K/UL (ref 0–0.06)
BASOPHILS # BLD: 0 % (ref 0–2)
BDY SITE: ABNORMAL
BUN SERPL-MCNC: 12 MG/DL (ref 7–18)
BUN/CREAT SERPL: 12 (ref 12–20)
CALCIUM SERPL-MCNC: 7.9 MG/DL (ref 8.5–10.1)
CALCULATED P AXIS, ECG09: 25 DEGREES
CALCULATED P AXIS, ECG09: 34 DEGREES
CALCULATED P AXIS, ECG09: 45 DEGREES
CALCULATED R AXIS, ECG10: -10 DEGREES
CALCULATED R AXIS, ECG10: -11 DEGREES
CALCULATED R AXIS, ECG10: -11 DEGREES
CALCULATED R AXIS, ECG10: -3 DEGREES
CALCULATED R AXIS, ECG10: -31 DEGREES
CALCULATED R AXIS, ECG10: -33 DEGREES
CALCULATED R AXIS, ECG10: -6 DEGREES
CALCULATED T AXIS, ECG11: 101 DEGREES
CALCULATED T AXIS, ECG11: 111 DEGREES
CALCULATED T AXIS, ECG11: 113 DEGREES
CALCULATED T AXIS, ECG11: 153 DEGREES
CALCULATED T AXIS, ECG11: 83 DEGREES
CALCULATED T AXIS, ECG11: 84 DEGREES
CALCULATED T AXIS, ECG11: 92 DEGREES
CHLORIDE SERPL-SCNC: 104 MMOL/L (ref 100–108)
CO2 SERPL-SCNC: 24 MMOL/L (ref 21–32)
CREAT SERPL-MCNC: 0.97 MG/DL (ref 0.6–1.3)
DIAGNOSIS, 93000: NORMAL
DIFFERENTIAL METHOD BLD: ABNORMAL
EOSINOPHIL # BLD: 0.1 K/UL (ref 0–0.4)
EOSINOPHIL NFR BLD: 1 % (ref 0–5)
ERYTHROCYTE [DISTWIDTH] IN BLOOD BY AUTOMATED COUNT: 12.9 % (ref 11.6–14.5)
GAS FLOW.O2 O2 DELIVERY SYS: ABNORMAL L/MIN
GAS FLOW.O2 SETTING OXYMISER: 14 BPM
GLUCOSE BLD STRIP.AUTO-MCNC: 146 MG/DL (ref 70–110)
GLUCOSE BLD STRIP.AUTO-MCNC: 181 MG/DL (ref 70–110)
GLUCOSE BLD STRIP.AUTO-MCNC: 184 MG/DL (ref 70–110)
GLUCOSE SERPL-MCNC: 174 MG/DL (ref 74–99)
HCO3 BLD-SCNC: 20.8 MMOL/L (ref 22–26)
HCT VFR BLD AUTO: 36 % (ref 35–45)
HGB BLD-MCNC: 11.7 G/DL (ref 12–16)
LYMPHOCYTES # BLD AUTO: 13 % (ref 21–52)
LYMPHOCYTES # BLD: 1.6 K/UL (ref 0.9–3.6)
MAGNESIUM SERPL-MCNC: 1.3 MG/DL (ref 1.8–2.4)
MCH RBC QN AUTO: 30.7 PG (ref 24–34)
MCHC RBC AUTO-ENTMCNC: 32.5 G/DL (ref 31–37)
MCV RBC AUTO: 94.5 FL (ref 74–97)
MONOCYTES # BLD: 0.4 K/UL (ref 0.05–1.2)
MONOCYTES NFR BLD AUTO: 3 % (ref 3–10)
NEUTS SEG # BLD: 9.9 K/UL (ref 1.8–8)
NEUTS SEG NFR BLD AUTO: 83 % (ref 40–73)
O2/TOTAL GAS SETTING VFR VENT: 60 %
P-R INTERVAL, ECG05: 206 MS
P-R INTERVAL, ECG05: 230 MS
P-R INTERVAL, ECG05: 242 MS
PCO2 BLD: 31.8 MMHG (ref 35–45)
PEEP RESPIRATORY: 5 CMH2O
PH BLD: 7.42 [PH] (ref 7.35–7.45)
PLATELET # BLD AUTO: 186 K/UL (ref 135–420)
PMV BLD AUTO: 10.8 FL (ref 9.2–11.8)
PO2 BLD: 181 MMHG (ref 80–100)
POTASSIUM SERPL-SCNC: 3.7 MMOL/L (ref 3.5–5.5)
Q-T INTERVAL, ECG07: 344 MS
Q-T INTERVAL, ECG07: 378 MS
Q-T INTERVAL, ECG07: 404 MS
Q-T INTERVAL, ECG07: 424 MS
Q-T INTERVAL, ECG07: 428 MS
Q-T INTERVAL, ECG07: 444 MS
Q-T INTERVAL, ECG07: 448 MS
QRS DURATION, ECG06: 116 MS
QRS DURATION, ECG06: 118 MS
QRS DURATION, ECG06: 124 MS
QRS DURATION, ECG06: 130 MS
QRS DURATION, ECG06: 130 MS
QRS DURATION, ECG06: 132 MS
QRS DURATION, ECG06: 144 MS
QTC CALCULATION (BEZET), ECG08: 426 MS
QTC CALCULATION (BEZET), ECG08: 471 MS
QTC CALCULATION (BEZET), ECG08: 480 MS
QTC CALCULATION (BEZET), ECG08: 499 MS
QTC CALCULATION (BEZET), ECG08: 506 MS
QTC CALCULATION (BEZET), ECG08: 562 MS
QTC CALCULATION (BEZET), ECG08: 578 MS
RBC # BLD AUTO: 3.81 M/UL (ref 4.2–5.3)
SAO2 % BLD: 100 % (ref 92–97)
SERVICE CMNT-IMP: ABNORMAL
SODIUM SERPL-SCNC: 140 MMOL/L (ref 136–145)
SPECIMEN TYPE: ABNORMAL
TOTAL RESP. RATE, ITRR: 14
VENTILATION MODE VENT: ABNORMAL
VENTRICULAR RATE, ECG03: 104 BPM
VENTRICULAR RATE, ECG03: 113 BPM
VENTRICULAR RATE, ECG03: 141 BPM
VENTRICULAR RATE, ECG03: 61 BPM
VENTRICULAR RATE, ECG03: 69 BPM
VENTRICULAR RATE, ECG03: 78 BPM
VENTRICULAR RATE, ECG03: 92 BPM
VT SETTING VENT: 450 ML
WBC # BLD AUTO: 12 K/UL (ref 4.6–13.2)

## 2017-01-17 PROCEDURE — 85025 COMPLETE CBC W/AUTO DIFF WBC: CPT | Performed by: HOSPITALIST

## 2017-01-17 PROCEDURE — 74011000250 HC RX REV CODE- 250: Performed by: INTERNAL MEDICINE

## 2017-01-17 PROCEDURE — C9113 INJ PANTOPRAZOLE SODIUM, VIA: HCPCS | Performed by: HOSPITALIST

## 2017-01-17 PROCEDURE — 71010 XR CHEST PORT: CPT

## 2017-01-17 PROCEDURE — 65660000000 HC RM CCU STEPDOWN

## 2017-01-17 PROCEDURE — 82962 GLUCOSE BLOOD TEST: CPT

## 2017-01-17 PROCEDURE — 93306 TTE W/DOPPLER COMPLETE: CPT

## 2017-01-17 PROCEDURE — 71275 CT ANGIOGRAPHY CHEST: CPT

## 2017-01-17 PROCEDURE — A9270 NON-COVERED ITEM OR SERVICE: HCPCS | Performed by: HOSPITALIST

## 2017-01-17 PROCEDURE — 36600 WITHDRAWAL OF ARTERIAL BLOOD: CPT

## 2017-01-17 PROCEDURE — 74011636320 HC RX REV CODE- 636/320: Performed by: EMERGENCY MEDICINE

## 2017-01-17 PROCEDURE — 74177 CT ABD & PELVIS W/CONTRAST: CPT

## 2017-01-17 PROCEDURE — 74011250637 HC RX REV CODE- 250/637: Performed by: HOSPITALIST

## 2017-01-17 PROCEDURE — 80048 BASIC METABOLIC PNL TOTAL CA: CPT | Performed by: HOSPITALIST

## 2017-01-17 PROCEDURE — 94640 AIRWAY INHALATION TREATMENT: CPT

## 2017-01-17 PROCEDURE — 74011000258 HC RX REV CODE- 258: Performed by: INTERNAL MEDICINE

## 2017-01-17 PROCEDURE — 77030013140 HC MSK NEB VYRM -A

## 2017-01-17 PROCEDURE — 74011250636 HC RX REV CODE- 250/636: Performed by: INTERNAL MEDICINE

## 2017-01-17 PROCEDURE — 74011250636 HC RX REV CODE- 250/636: Performed by: EMERGENCY MEDICINE

## 2017-01-17 PROCEDURE — 74011636637 HC RX REV CODE- 636/637: Performed by: HOSPITALIST

## 2017-01-17 PROCEDURE — 74011250637 HC RX REV CODE- 250/637: Performed by: INTERNAL MEDICINE

## 2017-01-17 PROCEDURE — 82803 BLOOD GASES ANY COMBINATION: CPT

## 2017-01-17 PROCEDURE — 74011000258 HC RX REV CODE- 258: Performed by: HOSPITALIST

## 2017-01-17 PROCEDURE — 83735 ASSAY OF MAGNESIUM: CPT | Performed by: HOSPITALIST

## 2017-01-17 PROCEDURE — 74011000250 HC RX REV CODE- 250: Performed by: HOSPITALIST

## 2017-01-17 PROCEDURE — 74011250636 HC RX REV CODE- 250/636: Performed by: HOSPITALIST

## 2017-01-17 PROCEDURE — 74011250636 HC RX REV CODE- 250/636: Performed by: FAMILY MEDICINE

## 2017-01-17 RX ORDER — MAGNESIUM SULFATE 1 G/100ML
1 INJECTION INTRAVENOUS ONCE
Status: COMPLETED | OUTPATIENT
Start: 2017-01-17 | End: 2017-01-17

## 2017-01-17 RX ORDER — LOPERAMIDE HYDROCHLORIDE 2 MG/1
2 CAPSULE ORAL
COMMUNITY
End: 2017-01-18

## 2017-01-17 RX ORDER — SODIUM CHLORIDE 9 MG/ML
100 INJECTION, SOLUTION INTRAVENOUS CONTINUOUS
Status: DISCONTINUED | OUTPATIENT
Start: 2017-01-17 | End: 2017-01-17

## 2017-01-17 RX ORDER — ESCITALOPRAM OXALATE 10 MG/1
5 TABLET ORAL DAILY
Status: DISCONTINUED | OUTPATIENT
Start: 2017-01-18 | End: 2017-01-18 | Stop reason: HOSPADM

## 2017-01-17 RX ORDER — GABAPENTIN 300 MG/1
300 CAPSULE ORAL 3 TIMES DAILY
Status: DISCONTINUED | OUTPATIENT
Start: 2017-01-17 | End: 2017-01-18 | Stop reason: HOSPADM

## 2017-01-17 RX ORDER — ASPIRIN 81 MG/1
81 TABLET ORAL DAILY
COMMUNITY

## 2017-01-17 RX ORDER — INSULIN LISPRO 100 [IU]/ML
INJECTION, SOLUTION INTRAVENOUS; SUBCUTANEOUS
Status: DISCONTINUED | OUTPATIENT
Start: 2017-01-17 | End: 2017-01-18 | Stop reason: HOSPADM

## 2017-01-17 RX ORDER — MAGNESIUM SULFATE HEPTAHYDRATE 40 MG/ML
2 INJECTION, SOLUTION INTRAVENOUS ONCE
Status: COMPLETED | OUTPATIENT
Start: 2017-01-17 | End: 2017-01-17

## 2017-01-17 RX ORDER — CHOLECALCIFEROL TAB 125 MCG (5000 UNIT) 125 MCG
5000 TAB ORAL DAILY
COMMUNITY

## 2017-01-17 RX ORDER — SULFAMETHOXAZOLE AND TRIMETHOPRIM 800; 160 MG/1; MG/1
1 TABLET ORAL 2 TIMES DAILY
COMMUNITY
Start: 2017-01-07 | End: 2017-01-18

## 2017-01-17 RX ORDER — ASPIRIN 81 MG/1
81 TABLET ORAL DAILY
Status: DISCONTINUED | OUTPATIENT
Start: 2017-01-18 | End: 2017-01-18 | Stop reason: HOSPADM

## 2017-01-17 RX ORDER — ATORVASTATIN CALCIUM 10 MG/1
10 TABLET, FILM COATED ORAL DAILY
Status: DISCONTINUED | OUTPATIENT
Start: 2017-01-18 | End: 2017-01-18 | Stop reason: HOSPADM

## 2017-01-17 RX ORDER — GENTAMICIN SULFATE 3 MG/ML
1 SOLUTION/ DROPS OPHTHALMIC 4 TIMES DAILY
Status: DISCONTINUED | OUTPATIENT
Start: 2017-01-17 | End: 2017-01-18 | Stop reason: HOSPADM

## 2017-01-17 RX ORDER — ATORVASTATIN CALCIUM 10 MG/1
10 TABLET, FILM COATED ORAL DAILY
COMMUNITY

## 2017-01-17 RX ORDER — MELATONIN
5000 DAILY
Status: DISCONTINUED | OUTPATIENT
Start: 2017-01-18 | End: 2017-01-18 | Stop reason: HOSPADM

## 2017-01-17 RX ADMIN — SODIUM CHLORIDE 100 ML/HR: 900 INJECTION, SOLUTION INTRAVENOUS at 11:56

## 2017-01-17 RX ADMIN — MAGNESIUM SULFATE HEPTAHYDRATE 1 G: 1 INJECTION, SOLUTION INTRAVENOUS at 05:14

## 2017-01-17 RX ADMIN — PIPERACILLIN AND TAZOBACTAM 3.38 G: 3; .375 INJECTION, POWDER, FOR SOLUTION INTRAVENOUS at 13:37

## 2017-01-17 RX ADMIN — INSULIN LISPRO 2 UNITS: 100 INJECTION, SOLUTION INTRAVENOUS; SUBCUTANEOUS at 21:58

## 2017-01-17 RX ADMIN — PIPERACILLIN AND TAZOBACTAM 3.38 G: 3; .375 INJECTION, POWDER, FOR SOLUTION INTRAVENOUS at 21:02

## 2017-01-17 RX ADMIN — CHLORHEXIDINE GLUCONATE 10 ML: 1.2 RINSE ORAL at 09:00

## 2017-01-17 RX ADMIN — GABAPENTIN 300 MG: 300 CAPSULE ORAL at 21:59

## 2017-01-17 RX ADMIN — METRONIDAZOLE 500 MG: 500 INJECTION, SOLUTION INTRAVENOUS at 10:48

## 2017-01-17 RX ADMIN — SODIUM CHLORIDE 1000 MG: 900 INJECTION, SOLUTION INTRAVENOUS at 21:58

## 2017-01-17 RX ADMIN — METRONIDAZOLE 500 MG: 500 INJECTION, SOLUTION INTRAVENOUS at 20:00

## 2017-01-17 RX ADMIN — SODIUM CHLORIDE 40 MG: 9 INJECTION, SOLUTION INTRAMUSCULAR; INTRAVENOUS; SUBCUTANEOUS at 09:22

## 2017-01-17 RX ADMIN — MAGNESIUM SULFATE HEPTAHYDRATE 2 G: 40 INJECTION, SOLUTION INTRAVENOUS at 06:29

## 2017-01-17 RX ADMIN — GENTAMICIN SULFATE 1 DROP: 3 SOLUTION/ DROPS OPHTHALMIC at 21:59

## 2017-01-17 RX ADMIN — IPRATROPIUM BROMIDE AND ALBUTEROL SULFATE 3 ML: .5; 3 SOLUTION RESPIRATORY (INHALATION) at 14:15

## 2017-01-17 RX ADMIN — MIDAZOLAM HYDROCHLORIDE 1 MG: 1 INJECTION, SOLUTION INTRAMUSCULAR; INTRAVENOUS at 02:37

## 2017-01-17 RX ADMIN — METRONIDAZOLE 500 MG: 500 INJECTION, SOLUTION INTRAVENOUS at 04:10

## 2017-01-17 RX ADMIN — SODIUM CHLORIDE 500 ML: 900 INJECTION, SOLUTION INTRAVENOUS at 05:26

## 2017-01-17 RX ADMIN — IOPAMIDOL 125 ML: 755 INJECTION, SOLUTION INTRAVENOUS at 02:00

## 2017-01-17 RX ADMIN — GENTAMICIN SULFATE 1 DROP: 3 SOLUTION/ DROPS OPHTHALMIC at 17:58

## 2017-01-17 RX ADMIN — SODIUM CHLORIDE 100 ML/HR: 900 INJECTION, SOLUTION INTRAVENOUS at 02:22

## 2017-01-17 RX ADMIN — PIPERACILLIN AND TAZOBACTAM 3.38 G: 3; .375 INJECTION, POWDER, FOR SOLUTION INTRAVENOUS at 08:13

## 2017-01-17 RX ADMIN — ENOXAPARIN SODIUM 40 MG: 40 INJECTION SUBCUTANEOUS at 23:43

## 2017-01-17 RX ADMIN — INSULIN LISPRO 2 UNITS: 100 INJECTION, SOLUTION INTRAVENOUS; SUBCUTANEOUS at 06:26

## 2017-01-17 RX ADMIN — PIPERACILLIN AND TAZOBACTAM 3.38 G: 3; .375 INJECTION, POWDER, FOR SOLUTION INTRAVENOUS at 03:22

## 2017-01-17 NOTE — ED NOTES
Pt hourly rounding competed. Safety   Pt (x) resting on stretcher with side rails up and call bell in reach. () in chair    () in parents arms. Toileting   Pt offered ()Bedpan     ()Assistance to Restroom     ()Urinal  Ongoing Updates  Updated on plan of care and status of test results.   Pain Management  Inquired as to comfort and offered comfort measures:    () warm blankets   () dimmed lights]

## 2017-01-17 NOTE — ED NOTES
Report received from [facility], pt appears to be asleep on arrival into room, but easily awakens, pt appropriately responding to questions and requesting tube to come out, son at bedside, pt moving in bed on own, pt sitting up. IV sites without redness or swelling, IV infusing by pumps and verified with Mimi Aldridge RN, Mora appears to be draining without any difficulty. Call bell within reach. Side rails up, bed in low position. No diarrhea at this time.

## 2017-01-17 NOTE — ED NOTES
Pt extubated tolerated well, pt began talking immediately, son at bedside, pt mouth care done.  Oxygen n/c 2L

## 2017-01-17 NOTE — PROGRESS NOTES
Vancomycin - Pharmacy to Dose  Consult provided for this 80y.o. year old ,female   Therapy day 1        Wt Readings from Last 1 Encounters:   01/16/17 69.9 kg (154 lb)       Ht Readings from Last 1 Encounters:   01/16/17 162.6 cm (64\")     Dosing Weight:  69.9 kg    Antibiotic regimen:  Flagyl, Zosyn    Date:   1/16/17   Lab Results   Component Value Date/Time    Creatinine 1.07 01/16/2017 12:40 PM    Creatinine (POC) 0.8 04/03/2015 09:28 AM     creatinine clearance   36.9 ml/min    white blood cell count   7.1    Will dose Vancomycin 1250 mg IV now, then Vancomycin 1000 mg IV q24hrs. Trough Level after 3-4 doses infused. Dose calculated to approximate a therapeutic trough of 15-20 mcg/mL. Pharmacy to follow daily and will make changes to dose and/or frequency based on clinical status.       [address] [facility], [address] [place] [address]

## 2017-01-17 NOTE — ED NOTES
Dr. Zhou Au here to see pt, orders to decrease Fentanyl to 25mcg, to call respiratory to extubate pt

## 2017-01-17 NOTE — ED NOTES
Pt is resting in position of comfort. Pt remains on cardiac monitor and will continue to monitor at nurses station.

## 2017-01-17 NOTE — PROGRESS NOTES
Bedside reevaluated pt due to hypotensive and emily s/p due to sedation. Levophed was started, case discussed with dr. Sandra Wood for sedation, she will adjust medication,  Case discussed with Dr. Kegean Smart for possible central line if pt needed. Will start flagyl for possible c diff.

## 2017-01-17 NOTE — ED NOTES
Pt is still alert while vented despite medication, Dr Francheska Prieto contacted and new orders received. Pt educated on ROM exercises that this RN will perform on patient, patients family remains at bedside. Pt and family were updated on plan of care waiting on an admission bed in the ICU. Pt remains on vent and cardiac monitor and will continue to be monitored through central monitoring system at nurses station.

## 2017-01-17 NOTE — ED NOTES
Report received from [facility] assumed care of pt. Pt resting comfortably in stretcher with family at bedside. Pt remains awake and on the vent despite multiple medications. Dr Constantine Cardenas was paged for new sedation orders. Pt family updated on plan of care awaiting for ICU bed.

## 2017-01-17 NOTE — PROGRESS NOTES
[facility]  Pulmonary, Critical Care, and Sleep Medicine    Name: Hannah Salazar MRN: [id number]   : 10/25/1931 Hospital: [facility] [place]   Date: 2017        Critical Care Progress      IMPRESSION:   Acute respiratory failure- secondary to acute pulmonary edema precipitated by accelerated HTN and Atrial fib with RVR. Currently intubated on ventilatory support- improved  Atrial fibrillation with RVR- has had h/o atrial fib  Gastroenteritis  AMS  Anxiety   Patient Active Problem List   Diagnosis Code    Severe anemia D64.9    Atrial fibrillation ([facility]) I48.91    GI bleed K92.2    Diabetes ([facility]) E11.9    CAD (coronary artery disease) I25.10    Acute respiratory failure with hypoxia ([facility]) J96.01    Cardiac arrest ([facility]) I46.9    A-fib ([facility]) I48.91    Pulmonary edema J81.1    Diarrhea R19.7        RECOMMENDATIONS:   · SBT to extubation   · Diuresis and atrial fib rate control per cardiology  · Gentle hydration  · Glycemic control  · Correct electrolytes  · Stress ulcer and DVT prophylaxis  · Discussed with respiratory therapy and patient  · Can downgrade to telemetry     Subjective/History:   17  Patient has no new complaints- awake after sedation turned off. Denies SOB, chest pain. Some lower abd pain. Tolerated SBT and extubated successfully after assessment. HPI:  This patient has been seen and evaluated at the request of Dr. Shiv Dumont for Acute respiratory failure. Patient is a 80 y.o. female  with a PMHx of DM, CAD, and HLD presenting to the ED C/O dizziness, abdominal pain, and headache onset last night. Patient currently intubated and not able to provide information. History obtained by speaking with friend and ER staff>  \" Associated sx include periumbilical abd pain at an 8/10, dry mouth, hallucinations (saw her \"neighbor's dog in the room\"), diarrhea (soft stool), rhinorrhea, and nausea.  Pt denies eating any suspicious food or sick contacts recently. Pt has not taken an abx pill recently. Pt has not received her flu shot recently. Pt has a PCP Dr. Landry Ford. Pt reports an abd hernia with more pain at this site than usual. Pt has a PMHx of A-FIB, MI, UTI, and neuropathy. Pt reports allergies to Excedrin and Zantac. PSHx of hysterectomy, cholecystectomy, breast lumpectomy, and coronary stent placement. Pt reports some anxiety with thoughts of CT scanning or other small spaces. Pt is a former smoker and denies EtOH and recreational drug use. Pt denies fever, sore throat, chest pain, SOB, cough, vomiting, change in appetite, dysuria, and any other associated signs and sx\"  In ER patient became suddenly tachycardic with increased BP and was noted to be in atrial fib with RVR- was briefly pulseless and unresponsive and responded to 2-3 min of CPR. Intubated ,sedated and converted with shock. Currently on Cardizem drip.     Current Facility-Administered Medications   Medication Dose Route Frequency    0.9% sodium chloride infusion  100 mL/hr IntraVENous CONTINUOUS    dilTIAZem (CARDIZEM) 100 mg in 0.9% sodium chloride (MBP/ADV) 100 mL infusion  0-15 mg/hr IntraVENous TITRATE    insulin lispro (HUMALOG) injection   SubCUTAneous Q6H    pantoprazole (PROTONIX) 40 mg in sodium chloride 0.9 % 10 mL injection  40 mg IntraVENous DAILY    enoxaparin (LOVENOX) injection 40 mg  40 mg SubCUTAneous Q24H    metroNIDAZOLE (FLAGYL) IVPB premix 500 mg  500 mg IntraVENous Q8H    piperacillin-tazobactam (ZOSYN) 3.375 g in 0.9% sodium chloride (MBP/ADV) 100 mL MBP  3.375 g IntraVENous Q6H    Vancomycin - Pharmacokinetic Dosing  1 Each Other Rx Dosing/Monitoring    vancomycin (VANCOCIN) 1,000 mg in 0.9% sodium chloride (MBP/ADV) 250 mL adv  1,000 mg IntraVENous Q24H     Allergies   Allergen Reactions    Excedrin [Acetaminophen-Caffeine] Other (comments)     \"climbing the walls\"    Zantac [Ranitidine Hcl] Other (comments)     \"climbing the walls\"      Review of Systems:  Review of systems not obtained due to patient factors. Objective:   Vital Signs:    Visit Vitals    /55    Pulse 100    Temp 98.1 °F (36.7 °C)    Resp 17    Ht 5' 4\" (1.626 m)    Wt 69.9 kg (154 lb)    SpO2 98%    BMI 26.43 kg/m2       O2 Device: Nasal cannula       Temp (24hrs), Av.4 °F (37.4 °C), Min:98.1 °F (36.7 °C), Max:100.8 °F (38.2 °C)       Intake/Output:   Last shift:         Last 3 shifts: 01/15 1901 -  0700  In: -   Out: 2300 [Urine:2300]    Intake/Output Summary (Last 24 hours) at 17 1439  Last data filed at 17 0500   Gross per 24 hour   Intake                0 ml   Output             2300 ml   Net            -2300 ml         Ventilator Settings:  Mode Rate Tidal Volume Pressure FiO2 PEEP   Spontaneous   450 ml  7 cm H2O 30 % 5 cm H20     Peak airway pressure: 32 cm H2O    Minute ventilation: 6.5 l/min      ARDS network Guidelines: Lung protective strategy and Pl pressure goals-<30    Physical Exam:    General:  Intubated, sedated, ETT orally at first exam. Later- extubated   Head:  Normocephalic, without obvious abnormality, atraumatic. Eyes:  Conjunctivae/corneas clear. PERRL, EOMs intact. Nose: Nares normal. Septum midline. Mucosa normal. No drainage or sinus tenderness. Throat: Lips, mucosa   Neck: Supple, symmetrical, trachea midline, no adenopathy, thyroid: no enlargment/tenderness/nodules, no carotid bruit and no JVD. Back:   Symmetric, no curvature. ROM normal.   Lungs:   Bilateral crackles   Chest wall:  No tenderness or deformity. Heart:  Regular rate and rhythm, S1, S2 normal, no murmur, click, rub or gallop. Abdomen:   Soft, non-tender. Bowel sounds normal. No masses,  No organomegaly. Extremities: Extremities normal, atraumatic, no cyanosis or edema. Pulses: 2+ and symmetric all extremities.    Skin: Skin color, texture, turgor normal. No rashes or lesions   Lymph nodes:      Cervical, supraclavicular, and axillary nodes normal. Neurologic: Grossly nonfocal       Data:     Recent Results (from the past 24 hour(s))   POC G3    Collection Time: 01/16/17  2:48 PM   Result Value Ref Range    Device: VENT      FIO2 (POC) 100 %    pH (POC) 7.242 (LL) 7.35 - 7.45      pCO2 (POC) 46.3 (H) 35.0 - 45.0 MMHG    pO2 (POC) 258 (H) 80 - 100 MMHG    HCO3 (POC) 19.9 (L) 22 - 26 MMOL/L    sO2 (POC) 100 (H) 92 - 97 %    Base deficit (POC) 7 mmol/L    Mode ASSIST CONTROL      Tidal volume 450 ml    Set Rate 14 bpm    PEEP/CPAP (POC) 5 cmH2O    Allens test (POC) YES      Inspiratory Time 0.9 sec    Total resp. rate 14      Site RIGHT RADIAL      Specimen type (POC) ARTERIAL      Performed by [facility] [place] [facility]     Volume control YES     TYPE & SCREEN    Collection Time: 01/16/17  4:45 PM   Result Value Ref Range    Crossmatch Expiration 01/19/2017     ABO/Rh(D) [place] Shock POSITIVE     Antibody screen NEG    EKG, 12 LEAD, SUBSEQUENT    Collection Time: 01/16/17  6:29 PM   Result Value Ref Range    Ventricular Rate 50 BPM    Atrial Rate 48 BPM    QRS Duration 114 ms    Q-T Interval 498 ms    QTC Calculation (Bezet) 454 ms    Calculated R Axis -24 degrees    Calculated T Axis 57 degrees    Diagnosis       Atrial fibrillation with slow ventricular response with a competing   junctional pacemaker with premature ventricular or aberrantly conducted   complexes  Inferior infarct , age undetermined  Abnormal ECG  When compared with ECG of 16-JAN-2017 13:48,  Vent.  rate has decreased BY  63 BPM  Inferior infarct is now present  T wave inversion no longer evident in Lateral leads     CULTURE, BLOOD    Collection Time: 01/16/17  8:45 PM   Result Value Ref Range    Special Requests: NO SPECIAL REQUESTS      Culture result: NO GROWTH AFTER 12 HOURS     CULTURE, BLOOD    Collection Time: 01/16/17  9:00 PM   Result Value Ref Range    Special Requests: NO SPECIAL REQUESTS      Culture result: NO GROWTH AFTER 12 HOURS     GLUCOSE, POC    Collection Time: 01/16/17 11:41 PM   Result Value Ref Range    Glucose (POC) 135 (H) 70 - 110 mg/dL   CBC WITH AUTOMATED DIFF    Collection Time: 01/17/17  3:30 AM   Result Value Ref Range    WBC 12.0 4.6 - 13.2 K/uL    RBC 3.81 (L) 4.20 - 5.30 M/uL    HGB 11.7 (L) 12.0 - 16.0 g/dL    HCT 36.0 35.0 - 45.0 %    MCV 94.5 74.0 - 97.0 FL    MCH 30.7 24.0 - 34.0 PG    MCHC 32.5 31.0 - 37.0 g/dL    RDW 12.9 11.6 - 14.5 %    PLATELET 983 197 - 697 K/uL    MPV 10.8 9.2 - 11.8 FL    NEUTROPHILS 83 (H) 40 - 73 %    LYMPHOCYTES 13 (L) 21 - 52 %    MONOCYTES 3 3 - 10 %    EOSINOPHILS 1 0 - 5 %    BASOPHILS 0 0 - 2 %    ABS. NEUTROPHILS 9.9 (H) 1.8 - 8.0 K/UL    ABS. LYMPHOCYTES 1.6 0.9 - 3.6 K/UL    ABS. MONOCYTES 0.4 0.05 - 1.2 K/UL    ABS. EOSINOPHILS 0.1 0.0 - 0.4 K/UL    ABS. BASOPHILS 0.0 0.0 - 0.06 K/UL    DF AUTOMATED     METABOLIC PANEL, BASIC    Collection Time: 01/17/17  3:30 AM   Result Value Ref Range    Sodium 140 136 - 145 mmol/L    Potassium 3.7 3.5 - 5.5 mmol/L    Chloride 104 100 - 108 mmol/L    CO2 24 21 - 32 mmol/L    Anion gap 12 3.0 - 18 mmol/L    Glucose 174 (H) 74 - 99 mg/dL    BUN 12 7.0 - 18 MG/DL    Creatinine 0.97 0.6 - 1.3 MG/DL    BUN/Creatinine ratio 12 12 - 20      GFR est AA >60 >60 ml/min/1.73m2    GFR est non-AA 55 (L) >60 ml/min/1.73m2    Calcium 7.9 (L) 8.5 - 10.1 MG/DL   MAGNESIUM    Collection Time: 01/17/17  3:30 AM   Result Value Ref Range    Magnesium 1.3 (L) 1.8 - 2.4 mg/dL   GLUCOSE, POC    Collection Time: 01/17/17  6:17 AM   Result Value Ref Range    Glucose (POC) 184 (H) 70 - 110 mg/dL   POC G3    Collection Time: 01/17/17  8:21 AM   Result Value Ref Range    Device: VENT      FIO2 (POC) 60 %    pH (POC) 7.424 7.35 - 7.45      pCO2 (POC) 31.8 (L) 35.0 - 45.0 MMHG    pO2 (POC) 181 (H) 80 - 100 MMHG    HCO3 (POC) 20.8 (L) 22 - 26 MMOL/L    sO2 (POC) 100 (H) 92 - 97 %    Base deficit (POC) 4 mmol/L    Mode ASSIST CONTROL      Tidal volume 450 ml    Set Rate 14 bpm    PEEP/CPAP (POC) 5 cmH2O    Allens test (POC) YES      Total resp. rate 14      Site LEFT RADIAL      Specimen type (POC) ARTERIAL      Performed by Tom Burns    GLUCOSE, POC    Collection Time: 01/17/17 11:58 AM   Result Value Ref Range    Glucose (POC) 146 (H) 70 - 110 mg/dL           Recent Labs      01/17/17   0821  01/16/17   1448   FIO2I  60  100   HCO3I  20.8*  19.9*   PCO2I  31.8*  46.3*   PHI  7.424  7.242*   PO2I  181*  258*     Telemetry:AFIB    Imaging:  I have personally reviewed the patients radiographs and have reviewed the reports:  CXR Results  (Last 48 hours)               01/17/17 0349  XR CHEST PORT Final result    Impression:  IMPRESSION:           1. Support lines and tubes as above, consider retraction of endotracheal tube by   1-2 cm for better positioning. 2. Interval improvement in peribronchial and reticular interstitial markings and   haziness suggestive of improving atypical infiltrate. Mild retrocardiac left   basilar atelectasis or infiltrate is stable to slightly more conspicuous from   comparison exam.       Narrative:  Chest AP portable       INDICATION: Endotracheal tube. COMPARISON: X-ray 01/16/2017. FINDINGS: Endotracheal tube tip is 1.9 cm from the joe, consider retraction   by 1-2 cm for better positioning. NG tube extends into the stomach out of   field-of-view. Defibrillator pad and monitoring leads overlie the chest.       Cardiac silhouette and pulmonary vascularity are within normal limits. Probable   mitral annulus calcifications. There is persistent but significantly improved   peribronchial and perihilar opacities and improvement in diffuse haziness and   interstitial markings. There is persistent mild retrocardiac left basilar   streaky alveolar opacity. No new airspace disease. No evidence for pleural   effusion or pneumothorax.           01/16/17 1519  XR CHEST PORT Final result    Impression:  Impression:           1. Tubes appear in satisfactory position. No pneumothorax.    2. Interstitial changes could represent developing pulmonary edema but actually   appear improved from prior. 3. Minor atelectasis in both lungs, worse than prior. Narrative:  History: Tube placement       Comparison: 8/31/2015. Exam performed AP portable at 1515. Findings: Endotracheal tube tip is 0.4 cm above the joe. NG tube tip passes   off the film in the upper abdomen consistent with insertion of the stomach. No   significant pneumothorax. No effusion. Heart size normal. There is some   engorgement of the pulmonary vascularity centrally. There is increased   interstitial changes in both lungs, which may actually be improved from prior. There is subsegmental atelectasis in both midlung fields, worse than prior. CXR: 1/16/17:  1. Tubes appear in satisfactory position. No pneumothorax. 2. Interstitial changes could represent developing pulmonary edema but actually  appear improved from prior. 3. Minor atelectasis in both lungs, worse than prior. Best practice :  Core measures:    Glycemic control  [facility]. Ventilated patients- aim to keep peak plateau pressure 43-01BD H2O. Sress ulcer prophylaxis  DVT prophylaxis       Vent Bundle Followed, Vent Day 2       Total critical care time exclusive of procedures: 40 minutes  Total of 40 min critical care time spent at bedside during the course of care providing evaluation,management and care decisions and ordering appropriate treatment related to critical care problems exclusive of procedures. The reason for providing this level of medical care for this critically ill patient was due a critical illness that impaired one or more vital organ systems . This care involved high complexity decision making to assess, manipulate, and support vital system functions, to treat this degree vital organ system failure and to prevent further life threatening deterioration of the patients condition.     Andrei Santacruz MD

## 2017-01-17 NOTE — ED NOTES
Attempted to call ICU nurse to give report was notified that receiving nurse is currently speaking w/ manager and nursing supervisor and that pt is to be currently held.

## 2017-01-17 NOTE — ED NOTES
Bedside and Verbal shift change report given to Rosa Yanez RN (oncoming nurse) by Breanna Carey (offgoing nurse). Report included the following information SBAR, ED Summary, Recent Result, MAR. Pt resting comfortably in stretcher with family at bedside. Updated pt on plan of care awaiting admission bed. Pt verbalized understanding, warm blankets provided to pt for comfort, bed in lowest lock position, side rails up x2, call bell in reach of pt and pt instructed to use call bell for assistance.

## 2017-01-17 NOTE — ED NOTES
Pt bedside report given to Desiree Bates RN. Pt resting in stretcher, with side rails raised, and call light within reach. Pt remains on ventilator and soft wrist restraints. Family at bedside.

## 2017-01-17 NOTE — PROGRESS NOTES
Hospitalist Progress Note-critical care note     Patient: Kushal Bob MRN: [id number]  CSN: [id number]    YOB: 1931  Age: 80 y.o. Sex: female    DOA: 1/16/2017 LOS:  LOS: 0 days            Chief complaint: acute respiratory failure, hypomagnesemia ,     Assessment/Plan         Patient Active Problem List   Diagnosis Code    Severe anemia D64.9    Atrial fibrillation (HCC) I48.91    GI bleed K92.2    Diabetes ([facility] E11.9    CAD (coronary artery disease) I25.10    Acute respiratory failure with hypoxia (HCC) J96.01    Cardiac arrest ([facility] I46.9    A-fib ([facility]) I48.91    Pulmonary edema J81.1    Diarrhea R19.7   1. Respiratory arrest   2. Cardiac arrest   3. afib with rvr   4. Pulmonary edema  5. DM type II  6. Diarrhea  7 cad   8. Anxiety   Plan  Pulm: on vent, will be extubated today. No PE from cta, possible  pna   Card: rate controlled. Will have echo, Dr. Felecia Valadez was on board   GI diarrhea: ct abdomen   Neuro: SBT am , alert and oriented   Renal: catheter, in/out ,electrolytes icu protocol   Endo: DM type II , ssi     30  minutes of critical care time spent in the direct evaluation and treatment of this high risk patient. The reason for providing this level of medical care for this critically ill patient was due a critical illness that impaired one or more vital organ systems such that there was a high probability of imminent or life threatening deterioration in the patients condition. This care involved high complexity decision making to assess, manipulate, and support vital system functions, to treat this degreee vital organ system failure and to prevent further life threatening deterioration of the patients condition.     Review of systems:  Unable to obtain due to on the vent,     Vital signs/Intake and Output:  Visit Vitals    /57    Pulse 97    Temp 98.1 °F (36.7 °C)    Resp 21    Ht 5' 4\" (1.626 m)    Wt 69.9 kg (154 lb)    SpO2 100%    BMI 26.43 kg/m2 Current Shift:     Last three shifts:  01/15 1901 - 01/17 0700  In: -   Out: 2300 [Urine:2300]    Physical Exam:  General: WD, WN. Alert, cooperative, no acute distress    HEENT: NC, Atraumatic. PERRLA, anicteric sclerae. ET tube noted   Lungs: CTA Bilaterally. No Wheezing/Rhonchi/Rales. Heart:  Regular  rhythm,  No murmur, No Rubs, No Gallops  Abdomen: Soft, Non distended, Non tender.  +Bowel sounds,   Extremities: No c/c/e  Psych:   Not anxious or agitated. Neurologic:  Unable to detect due to on vent           Labs: Results:       Chemistry Recent Labs      01/17/17   0330  01/16/17   1240   GLU  174*  156*   NA  140  139   K  3.7  4.5   CL  104  102   CO2  24  28   BUN  12  14   CREA  0.97  1.07   CA  7.9*  10.1   AGAP  12  9   BUCR  12  13   AP   --   115   TP   --   7.7   ALB   --   3.5   GLOB   --   4.2*   AGRAT   --   0.8      CBC w/Diff Recent Labs      01/17/17   0330  01/16/17   1240   WBC  12.0  7.1   RBC  3.81*  4.51   HGB  11.7*  14.2   HCT  36.0  43.6   PLT  186  210   GRANS  83*  70   LYMPH  13*  21   EOS  1  4      Cardiac Enzymes Recent Labs      01/16/17   1240   CPK  56   CKND1  2.5      Coagulation Recent Labs      01/16/17   1240   PTP  11.2*   INR  0.8   APTT  26.6       Lipid Panel No results found for: CHOL, CHOLPOCT, CHOLX, CHLST, CHOLV, 567925, HDL, LDL, NLDLCT, DLDL, LDLC, DLDLP, 467840, VLDLC, VLDL, TGL, TGLX, TRIGL, [id number], TRIGP, TGLPOCT, 364328, CHHD, CHHDX   BNP No results for input(s): BNPP in the last 72 hours.    Liver Enzymes Recent Labs      01/16/17   1240   TP  7.7   ALB  3.5   AP  115   SGOT  4*      Thyroid Studies Lab Results   Component Value Date/Time    TSH 1.08 04/04/2015 06:30 AM        Procedures/imaging: see electronic medical records for all procedures/Xrays and details which were not copied into this note but were reviewed prior to creation of Shelley Kilgore MD

## 2017-01-17 NOTE — ED NOTES
Pt remains fully alert despite medications, Dr Brittney Arnett paged for new orders, awaiting a return call.

## 2017-01-17 NOTE — CONSULTS
[address] [place] [address] Rd    Name:  Des Yepez  MR#:  [id number]  :  10/25/1931  Account #:  [de-identified]  Date of Adm:  2017  Date of Consultation:  2017      REASON FOR CONSULTATION: Possible acute MI, possible code STEMI  activation. HISTORY OF PRESENT ILLNESS: This patient is an 80-year-old. I  responded to possible STEMI CODE in the ER, the patient was undergoing CPR/  coding and intubating the patient. The patient came in with abdominal  pain, headache, dizziness and first EKG had shown left bundle branch  block and first cardiac markers were also normal. The patient has a  history of intermittent left bundle branch block in the past also and  during intubation, the patient went into atrial fibrillation and then later,  the ER physician had cardioverted with the shock treatment as well. I  was unable to get a history directly because of intubation process. A  repeat EKG has not shown any clear-cut ST elevation and the EKG  was consistent with old left bundle branch block and even before  intubation, the patient awake and denied any chest pain also. The main  issue was shortness of breath and respiratory failure. PAST MEDICAL HISTORY: Significant for:  1. CAD. 2. Congestive heart failure. 3. Atrial fibrillation. 4. Hyperlipidemia. 5. Neuropathy. 6. Urinary tract infection history. SURGICAL HISTORY: Significant for hysterectomy, cholecystectomy  and breast lumpectomy and a coronary artery stent in the past also. MEDICATIONS: Reviewed in the chart. REVIEW OF SYSTEMS: Unobtainable. ALLERGIES: Reviewed in the chart. SOCIAL HISTORY: Unable to obtain. PHYSICAL EXAMINATION  VITAL SIGNS: At the time of the consultation, the patient's pressure  was 200/120, heart rate was initially 100 and then went into 160 atrial  fibrillation, afebrile, intubating at this point, cannot calculate the  accurate respiration rate.   HEART: S1 is variable, S2 is also audible. LUNGS: Bilateral air entry positive. ABDOMEN: Soft. EXTREMITIES: No edema. The rest of the system is unable to examine. LABORATORY DATA: Has shown a hemoglobin of 14.2, white count  of 7.1, platelet count is 036. INR 0.8. Cardiac troponin is 0.02. BNP is  1958. Creatinine is 1.07. EKG: Left bundle branch block, which was  present before. ASSESSMENT AND PLAN  1. Acute respiratory failure. 2. Cardiac arrest, probably due to respiratory failure and atrial  fibrillation with rapid ventricular response. 3. Altered mental status. 4. Hypertension. 5. Coronary artery disease. 6. Chronic intermittent LBBB    PLAN: At this point, the patient is intubated. The patient is converted  into sinus rhythm. Continue with the Cardizem. Continue with the  current medical treatment. Continue with Lovenox, hydralazine and  echocardiogram is pending.  The treatment plan was discussed with  Dr. Akin Curiel MD    [place] [facility]  D:  01/16/2017   17:49  T:  01/16/2017   19:30  Job #:  [id number]

## 2017-01-17 NOTE — ED NOTES
Pt is resting in a position of comfort on bed. Pt family member remains at bedside. Pt vital signs remain stable and patient remains on cardiac monitor and will continue to monitor at nurses station through central monitoring system.

## 2017-01-17 NOTE — ED NOTES
Bedside change of shift report received from Maddie. Included SBAR and MAR    Pt hourly rounding competed. Safety   Pt (x) resting on stretcher with side rails up and call bell in reach. () in chair    () in parents arms. Toileting   Pt offered ()Bedpan     ()Assistance to Restroom     ()Urinal  Ongoing Updates  Updated on plan of care and status of test results.   Pain Management  Inquired as to comfort and offered comfort measures:    () warm blankets   () dimmed lights

## 2017-01-17 NOTE — ED NOTES
Pt is resting in a position of comfort on stretcher, family remains at bedside. Pt remains on cardiac monitor and continues to be monitored through central monitoring system at nurses station.

## 2017-01-17 NOTE — ED NOTES
Pt was placed on inpatient bed in a position of comfort. While this RN and RT was in the room patient noted to be biting on intubation tube and appeared to become agitated. Pt was educated on not biting the intubation tube and was attention was redirected.

## 2017-01-17 NOTE — PROGRESS NOTES
Admission Medication Reconciliation has been performed on this ED patient consisting of interview of the patient regarding their PTA Home Medication List, Allergies and PMH as well as obtaining outpatient pharmacy information. Interviewed patient's son as pt currently intubated. Patient did not provide a written list of home medications. Son did provide all the medication bottles from home  Patient's outpatient pharmacy is [address] [place] [address]. Smoking status is ? Alcohol use ? Illicit drug use ? Patient ABX use within the past 30 days = currently taking Bactrim DS  Has patient received any antineoplastics in the past 30 days? ?  Has patient received any radiation treatments in the past 45 days? ? Medication Reconciliation Interventions:   Wrong Medication Identified 0  Wrong/missing medication strength or dose identified  0  Wrong/missing Interval Identified 1  Wrong/missing Route Identified 0  Medication Duplication 0  Omissions 5  Commissions 0  Other Issue(s) Identified (Indicate): 0            Medication Compliance Issues and/or Medication Concerns:  A vial of Amoxicillin 500mg was presented, it was dated January 2016 and contained quite a few capsules. Son also states that she has an eye infection that has existed since May and she is using some compounded eye gtts that contains her urine? ?  I spoke w/ Rachael Hendrickson at [facility] who states she is only getting commercially prepared gentamycin eye gtts.      [address] [facility] Pharmacist  [phone]

## 2017-01-17 NOTE — PROGRESS NOTES
Discussed with Dr. Yasemin Sanford about sedation optimization. Propofol had to stopped due to bradycardia and hypotensive. Will place on Fentanyl drip and use prn versed. Orders placed.

## 2017-01-17 NOTE — ED NOTES
Bedside report given to [facility], pt reports she is feeling fine, son at bedside,pt continues to have oxygen n/c at 4L, pt has an occasional cough, IV without redness or swelling, IV fluids infusing per pump without difficulty, bed remains in low position, call bell within reach, side rails up, no needs at present.

## 2017-01-17 NOTE — ED NOTES
Pt hourly rounding competed. Safety   Pt (x) resting on stretcher with side rails up and call bell in reach. () in chair    () in parents arms. Toileting   Pt offered ()Bedpan     ()Assistance to Restroom     ()Urinal  Ongoing Updates  Updated on plan of care and status of test results. Pain Management  Inquired as to comfort and offered comfort measures:    () warm blankets   () dimmed lights    Patient given jello and ice water. Patient tolerated Ice water. Patient reports had slight increase in coughing.  This RN held jello at this time

## 2017-01-17 NOTE — PROGRESS NOTES
Patient instructed on IS and she achieved 500. Family instructed as well and to do every hour 10 breaths.

## 2017-01-17 NOTE — ED NOTES
Pt continues to remain awake and on the vent. Vital signs remain stable and on the cardiac monitor. Pt family remains at bedside and were updated on plan of care awaiting on ICU bed.

## 2017-01-17 NOTE — ED NOTES
resp at bedside for ABG's and vent check, pt continues to follow commands and nods appropriately, pt trying to talk. Pt remains comfortable at present.  No needs identified at this time

## 2017-01-18 VITALS
HEART RATE: 106 BPM | RESPIRATION RATE: 18 BRPM | BODY MASS INDEX: 26.29 KG/M2 | SYSTOLIC BLOOD PRESSURE: 136 MMHG | HEIGHT: 64 IN | OXYGEN SATURATION: 97 % | DIASTOLIC BLOOD PRESSURE: 73 MMHG | TEMPERATURE: 97.6 F | WEIGHT: 154 LBS

## 2017-01-18 LAB
ANION GAP BLD CALC-SCNC: 7 MMOL/L (ref 3–18)
ATRIAL RATE: 48 BPM
BASOPHILS # BLD AUTO: 0 K/UL (ref 0–0.06)
BASOPHILS # BLD: 1 % (ref 0–2)
BUN SERPL-MCNC: 7 MG/DL (ref 7–18)
BUN/CREAT SERPL: 10 (ref 12–20)
CALCIUM SERPL-MCNC: 7.9 MG/DL (ref 8.5–10.1)
CALCULATED R AXIS, ECG10: -24 DEGREES
CALCULATED T AXIS, ECG11: 57 DEGREES
CHLORIDE SERPL-SCNC: 108 MMOL/L (ref 100–108)
CO2 SERPL-SCNC: 26 MMOL/L (ref 21–32)
CREAT SERPL-MCNC: 0.71 MG/DL (ref 0.6–1.3)
DIAGNOSIS, 93000: NORMAL
DIFFERENTIAL METHOD BLD: ABNORMAL
EOSINOPHIL # BLD: 0.2 K/UL (ref 0–0.4)
EOSINOPHIL NFR BLD: 3 % (ref 0–5)
ERYTHROCYTE [DISTWIDTH] IN BLOOD BY AUTOMATED COUNT: 13.5 % (ref 11.6–14.5)
GLUCOSE BLD STRIP.AUTO-MCNC: 143 MG/DL (ref 70–110)
GLUCOSE BLD STRIP.AUTO-MCNC: 174 MG/DL (ref 70–110)
GLUCOSE SERPL-MCNC: 157 MG/DL (ref 74–99)
HCT VFR BLD AUTO: 34.5 % (ref 35–45)
HGB BLD-MCNC: 10.9 G/DL (ref 12–16)
LYMPHOCYTES # BLD AUTO: 22 % (ref 21–52)
LYMPHOCYTES # BLD: 1.8 K/UL (ref 0.9–3.6)
MAGNESIUM SERPL-MCNC: 1.8 MG/DL (ref 1.8–2.4)
MCH RBC QN AUTO: 30.9 PG (ref 24–34)
MCHC RBC AUTO-ENTMCNC: 31.6 G/DL (ref 31–37)
MCV RBC AUTO: 97.7 FL (ref 74–97)
MONOCYTES # BLD: 0.5 K/UL (ref 0.05–1.2)
MONOCYTES NFR BLD AUTO: 6 % (ref 3–10)
NEUTS SEG # BLD: 5.5 K/UL (ref 1.8–8)
NEUTS SEG NFR BLD AUTO: 68 % (ref 40–73)
PLATELET # BLD AUTO: 151 K/UL (ref 135–420)
PMV BLD AUTO: 10.8 FL (ref 9.2–11.8)
POTASSIUM SERPL-SCNC: 3.3 MMOL/L (ref 3.5–5.5)
Q-T INTERVAL, ECG07: 498 MS
QRS DURATION, ECG06: 114 MS
QTC CALCULATION (BEZET), ECG08: 454 MS
RBC # BLD AUTO: 3.53 M/UL (ref 4.2–5.3)
SODIUM SERPL-SCNC: 141 MMOL/L (ref 136–145)
VENTRICULAR RATE, ECG03: 50 BPM
WBC # BLD AUTO: 8 K/UL (ref 4.6–13.2)

## 2017-01-18 PROCEDURE — 85025 COMPLETE CBC W/AUTO DIFF WBC: CPT | Performed by: HOSPITALIST

## 2017-01-18 PROCEDURE — 74011000250 HC RX REV CODE- 250: Performed by: HOSPITALIST

## 2017-01-18 PROCEDURE — 74011250636 HC RX REV CODE- 250/636: Performed by: HOSPITALIST

## 2017-01-18 PROCEDURE — 74011250637 HC RX REV CODE- 250/637: Performed by: INTERNAL MEDICINE

## 2017-01-18 PROCEDURE — 82962 GLUCOSE BLOOD TEST: CPT

## 2017-01-18 PROCEDURE — 74011000258 HC RX REV CODE- 258: Performed by: HOSPITALIST

## 2017-01-18 PROCEDURE — 77010033678 HC OXYGEN DAILY

## 2017-01-18 PROCEDURE — C9113 INJ PANTOPRAZOLE SODIUM, VIA: HCPCS | Performed by: HOSPITALIST

## 2017-01-18 PROCEDURE — 83735 ASSAY OF MAGNESIUM: CPT | Performed by: HOSPITALIST

## 2017-01-18 PROCEDURE — 36415 COLL VENOUS BLD VENIPUNCTURE: CPT | Performed by: HOSPITALIST

## 2017-01-18 PROCEDURE — 80048 BASIC METABOLIC PNL TOTAL CA: CPT | Performed by: HOSPITALIST

## 2017-01-18 PROCEDURE — 74011250637 HC RX REV CODE- 250/637: Performed by: HOSPITALIST

## 2017-01-18 RX ORDER — METOPROLOL TARTRATE 25 MG/1
12.5 TABLET, FILM COATED ORAL EVERY 12 HOURS
Qty: 1 TAB | Refills: 0 | Status: SHIPPED
Start: 2017-01-18

## 2017-01-18 RX ORDER — METOPROLOL TARTRATE 25 MG/1
12.5 TABLET, FILM COATED ORAL EVERY 12 HOURS
Status: DISCONTINUED | OUTPATIENT
Start: 2017-01-18 | End: 2017-01-18 | Stop reason: HOSPADM

## 2017-01-18 RX ORDER — POTASSIUM CHLORIDE 20 MEQ/1
40 TABLET, EXTENDED RELEASE ORAL
Status: DISCONTINUED | OUTPATIENT
Start: 2017-01-18 | End: 2017-01-18 | Stop reason: HOSPADM

## 2017-01-18 RX ORDER — IPRATROPIUM BROMIDE AND ALBUTEROL SULFATE 2.5; .5 MG/3ML; MG/3ML
3 SOLUTION RESPIRATORY (INHALATION)
Qty: 30 NEBULE | Refills: 0 | Status: SHIPPED | OUTPATIENT
Start: 2017-01-18

## 2017-01-18 RX ADMIN — ESCITALOPRAM OXALATE 5 MG: 10 TABLET ORAL at 08:58

## 2017-01-18 RX ADMIN — VITAMIN D, TAB 1000IU (100/BT) 5000 UNITS: 25 TAB at 08:58

## 2017-01-18 RX ADMIN — GABAPENTIN 300 MG: 300 CAPSULE ORAL at 08:58

## 2017-01-18 RX ADMIN — ATORVASTATIN CALCIUM 10 MG: 10 TABLET, FILM COATED ORAL at 08:58

## 2017-01-18 RX ADMIN — METRONIDAZOLE 500 MG: 500 INJECTION, SOLUTION INTRAVENOUS at 04:30

## 2017-01-18 RX ADMIN — PIPERACILLIN AND TAZOBACTAM 3.38 G: 3; .375 INJECTION, POWDER, FOR SOLUTION INTRAVENOUS at 09:02

## 2017-01-18 RX ADMIN — GENTAMICIN SULFATE 1 DROP: 3 SOLUTION/ DROPS OPHTHALMIC at 09:05

## 2017-01-18 RX ADMIN — GENTAMICIN SULFATE 1 DROP: 3 SOLUTION/ DROPS OPHTHALMIC at 13:40

## 2017-01-18 RX ADMIN — PIPERACILLIN AND TAZOBACTAM 3.38 G: 3; .375 INJECTION, POWDER, FOR SOLUTION INTRAVENOUS at 04:30

## 2017-01-18 RX ADMIN — ASPIRIN 81 MG: 81 TABLET, COATED ORAL at 08:58

## 2017-01-18 RX ADMIN — METOPROLOL TARTRATE 12.5 MG: 25 TABLET ORAL at 13:40

## 2017-01-18 RX ADMIN — METRONIDAZOLE 500 MG: 500 INJECTION, SOLUTION INTRAVENOUS at 13:37

## 2017-01-18 RX ADMIN — SODIUM CHLORIDE 40 MG: 9 INJECTION, SOLUTION INTRAMUSCULAR; INTRAVENOUS; SUBCUTANEOUS at 09:02

## 2017-01-18 NOTE — ROUTINE PROCESS
Bedside and Verbal shift change report given to Beba Kimble (oncoming nurse) by Gissel Rodriguez (offgoing nurse). Report included the following information SBAR, Kardex, MAR, Recent Results, Med Rec Status and Cardiac Rhythm ST w BBB.

## 2017-01-18 NOTE — PROGRESS NOTES
Cm informed by  pt being transferred to [facility], cm off case at this time,but remained available to staff if needed. Care Management Interventions  PCP Verified by CM: Yes  Palliative Care Consult (Criteria: CHF and RRAT>21): No  Reason for No Palliative Care Consult: Other (see comment)  Mode of Transport at Discharge:  Other (see comment)  Discharge Location  Discharge Placement: Other:

## 2017-01-18 NOTE — PROGRESS NOTES
Case discussed with cardiology-Dr. Effie Russell and Dr. Blank Ortiz. Dr. Reed Narayanan accepts patient. Dr. Alvin Syed will see pt as consult.

## 2017-01-18 NOTE — PROGRESS NOTES
Patient was visited by [facility]. Volunteer followed up with patient and/or family and reported no needs to this . Chaplains will continue to follow and will provide pastoral care as needed or requested. [address], M.Div.   Board Certified   [phone] - Office

## 2017-01-18 NOTE — ROUTINE PROCESS
Pt care received. Pt A/O x4. Pt resting in bed. Denies pain. Pt stable. Call light within reach, bed in lowest position and locked.

## 2017-01-18 NOTE — PROGRESS NOTES
Pt family wants to be transferred to [facility]. Sons reported that he talked with Dr. Samantha Skinner office and he was told it was OK to be transferred. Transfer center was called waiting for call back.

## 2017-01-18 NOTE — ROUTINE PROCESS
0000- Bedside and verbal report received from [facility] . Patient resting in bed with HOB elevated, no signs of distress. Call bell within reach. Whiteboard updated, bed locked and in lowest position. Alarm parameters reviewed and opportunities for questions provided. Education provided on how to call for assistance, patient demonstrated and verbalized understanding. Pt is on 2L/min nasal cannula oxygen and no continuous IV fluids. 6124-  Shift assessment performed and documented. Pt resting quietly in bed, elevated HOB. Pt lara intact and draining. No further needs at this time. The documentation for this period 1945-8910 is being entered following the guidelines as defined in the [facility] policy by Boogie Salinas RN.    7719- Pt resting quietly in bed with HOB elevated. No needs at this time. 0403- Reassessment performed and documented. Pt resting quietly in bed with HOB elevated. Pain at a tolerable level. 4351- Pt complaining of back pain, repositioned Pt in bed with HOB elevated and pillows behind hip/back. Will alert oncoming RN to address possible pain medication.

## 2017-01-18 NOTE — PROGRESS NOTES
Cardiology Progress Note        Patient: Marco A Leach        Sex: female          DOA: 1/16/2017  YOB: 1931      Age:  80 y.o.        LOS:  LOS: 1 day   Assessment/Plan     Principal Problem:    Acute respiratory failure with hypoxia ([facility] [address] [address] (1/16/2017)    Active Problems:    Cardiac arrest ([facility] [address] (1/16/2017)      A-fib ([facility] [address] (1/16/2017)      Pulmonary edema (1/16/2017)      Diarrhea (1/16/2017)        Plan:  Patient is already in the process of getting transfer to [facility] on patient & sone request..   Patient denied Chest and Pain  Maintaining SR with PACs and PVCs, with hypokalemia, supplement K  Pt is not on full anticoagulation for Afob due to history of lower GI bleed with blood transfusions. Echo with EF 40% to 45%  Continue with aspirin and metoprolol and change IV Cardizem to PO                  Subjective:    cc:  Denied CP      REVIEW OF SYSTEMS: NO CP, SOB, N,V,D, F,C  Objective:      Visit Vitals    /73 (BP 1 Location: Right arm, BP Patient Position: At rest;Supine)    Pulse (!) 106    Temp 97.6 °F (36.4 °C)    Resp 18    Ht 5' 4\" (1.626 m)    Wt 69.9 kg (154 lb)    SpO2 97%    BMI 26.43 kg/m2     Body mass index is 26.43 kg/(m^2). Physical Exam:  General Appearance: Comfortable, not using accessory muscles of respiration. HEENT: TESFAYE. HEAD: Atraumatic  NECK: No JVD, no thyroidomeglay. LUNGS: Clear bilaterally. HEART: S1+S2 audible,     ABD: Non-tender, BS Audible    EXT: No edema, and no cysnosis. VASCULAR EXAM: Pulses are intact. PSYCHIATRIC EXAM: Mood is appropriate.     Medication:  Current Facility-Administered Medications   Medication Dose Route Frequency    potassium chloride (K-DUR, KLOR-CON) SR tablet 40 mEq  40 mEq Oral NOW    insulin lispro (HUMALOG) injection   SubCUTAneous AC&HS    escitalopram oxalate (LEXAPRO) tablet 5 mg  5 mg Oral DAILY    gabapentin (NEURONTIN) capsule 300 mg  300 mg Oral TID    gentamicin (GARAMYCIN) 0.3 % ophthalmic solution 1 Drop  1 Drop Both Eyes QID    atorvastatin (LIPITOR) tablet 10 mg  10 mg Oral DAILY    aspirin delayed-release tablet 81 mg  81 mg Oral DAILY    cholecalciferol (VITAMIN D3) tablet 5,000 Units  5,000 Units Oral DAILY    dilTIAZem (CARDIZEM) 100 mg in 0.9% sodium chloride (MBP/ADV) 100 mL infusion  0-15 mg/hr IntraVENous TITRATE    glucose chewable tablet 16 g  4 Tab Oral PRN    glucagon (GLUCAGEN) injection 1 mg  1 mg IntraMUSCular PRN    dextrose (D50W) injection syrg 12.5-25 g  25-50 mL IntraVENous PRN    pantoprazole (PROTONIX) 40 mg in sodium chloride 0.9 % 10 mL injection  40 mg IntraVENous DAILY    enoxaparin (LOVENOX) injection 40 mg  40 mg SubCUTAneous Q24H    ELECTROLYTE REPLACEMENT PROTOCOL  1 Each Other PRN    ELECTROLYTE REPLACEMENT PROTOCOL  1 Each Other PRN    albuterol-ipratropium (DUO-NEB) 2.5 MG-0.5 MG/3 ML  3 mL Nebulization Q6H PRN    metroNIDAZOLE (FLAGYL) IVPB premix 500 mg  500 mg IntraVENous Q8H    piperacillin-tazobactam (ZOSYN) 3.375 g in 0.9% sodium chloride (MBP/ADV) 100 mL MBP  3.375 g IntraVENous Q6H    Vancomycin - Pharmacokinetic Dosing  1 Each Other Rx Dosing/Monitoring    vancomycin (VANCOCIN) 1,000 mg in 0.9% sodium chloride (MBP/ADV) 250 mL adv  1,000 mg IntraVENous Q24H               Lab/Data Reviewed: All lab results for the last 24 hours reviewed.      Recent Labs      01/18/17   0440  01/17/17   0330  01/16/17   1240   WBC  8.0  12.0  7.1   HGB  10.9*  11.7*  14.2   HCT  34.5*  36.0  43.6   PLT  151  186  210     Recent Labs      01/18/17   0440  01/17/17   0330  01/16/17   1240   NA  141  140  139   K  3.3*  3.7  4.5   CL  108  104  102   CO2  26  24  28   GLU  157*  174*  156*   BUN  7  12  14   CREA  0.71  0.97  1.07   CA  7.9*  7.9*  10.1       Signed By: Morris Winn MD     January 18, 2017

## 2017-01-18 NOTE — ROUTINE PROCESS
TRANSFER - IN REPORT:    Verbal report received from Sumi Lezama RN(name) on [facility]  being received from ED (unit) for routine progression of care      Report consisted of patients Situation, Background, Assessment and   Recommendations(SBAR). Information from the following report(s) SBAR, Kardex and Intake/Output was reviewed with the receiving nurse. Opportunity for questions and clarification was provided. Assessment completed upon patients arrival to unit and care assumed.

## 2017-01-18 NOTE — ROUTINE PROCESS
TRANSFER - OUT REPORT:    Verbal report given to Paula TOMLINSON RN(name) on [place] [facility]  being transferred to [facility]) for routine progression of care       Report consisted of patients Situation, Background, Assessment and   Recommendations(SBAR). Information from the following report(s) SBAR, Kardex, Intake/Output, MAR, Accordion, Recent Results and Cardiac Rhythm NSR was reviewed with the receiving nurse. Lines:   Peripheral IV 01/16/17 Right Wrist (Active)   Site Assessment Clean, dry, & intact 1/18/2017  8:57 AM   Phlebitis Assessment 0 1/18/2017  8:57 AM   Infiltration Assessment 0 1/18/2017  8:57 AM   Dressing Status Clean, dry, & intact 1/18/2017  8:57 AM   Dressing Type Tape;Transparent 1/18/2017  8:57 AM   Hub Color/Line Status Pink; Infusing;Flushed 1/18/2017  8:57 AM   Action Taken Open ports on tubing capped 1/18/2017  4:03 AM   Alcohol Cap Used Yes 1/18/2017  8:57 AM        Opportunity for questions and clarification was provided.       Patient transported with:   [facility]

## 2017-01-18 NOTE — PROGRESS NOTES
Patient accepted at [facility] going to bed 455. Nurse to call report to [phone].    1400:  Transport scheduled for 1500. Transfer center made aware.

## 2017-01-18 NOTE — ROUTINE PROCESS
Bedside shift change report given to [address] [place] [address] (oncoming nurse) by Sneha Benjamin RN   (offgoing nurse). Report included the following information SBAR, Kardex and MAR.

## 2017-01-18 NOTE — DISCHARGE SUMMARY
Discharge Summary    Patient: Meriel Riedel MRN: [id number]  CSN: [id number]    YOB: 1931  Age: 80 y.o. Sex: female    DOA: 1/16/2017 LOS:  LOS: 1 day   Discharge Date:      Primary Care Provider:  Anna Wakefield MD    Admission Diagnoses: Acute respiratory failure with hypoxia [facility])    Discharge Diagnoses:    Patient Active Problem List   Diagnosis Code    Severe anemia D64.9    Atrial fibrillation ([facility]) I48.91    GI bleed K92.2    Diabetes ([facility]) E11.9    CAD (coronary artery disease) I25.10    Acute respiratory failure with hypoxia ([facility]) J96.01    Cardiac arrest ([facility]) I46.9    A-fib ([facility]) I48.91    Pulmonary edema J81.1    Diarrhea R19.7       Discharge Condition: Stable    Discharge Medications:     Current Discharge Medication List      START taking these medications    Details   albuterol-ipratropium (DUO-NEB) 2.5 mg-0.5 mg/3 ml nebu 3 mL by Nebulization route every six (6) hours as needed for Other (wheezing). Qty: 30 Nebule, Refills: 0      metoprolol tartrate (LOPRESSOR) 25 mg tablet Take 0.5 Tabs by mouth every twelve (12) hours. Qty: 1 Tab, Refills: 0         CONTINUE these medications which have NOT CHANGED    Details   cholecalciferol, VITAMIN D3, (VITAMIN D3) 5,000 unit tab tablet Take 5,000 Units by mouth daily. aspirin delayed-release 81 mg tablet Take 81 mg by mouth daily. atorvastatin (LIPITOR) 10 mg tablet Take 10 mg by mouth daily. gentamicin (GARAMYCIN) 0.3 % ophthalmic solution Administer 1 Drop to both eyes four (4) times daily. escitalopram oxalate (LEXAPRO) 5 mg tablet Take 5 mg by mouth daily. gabapentin (NEURONTIN) 300 mg capsule Take 300 mg by mouth three (3) times daily.          STOP taking these medications       loperamide (IMODIUM) 2 mg capsule Comments:   Reason for Stopping:         trimethoprim-sulfamethoxazole (BACTRIM DS) 160-800 mg per tablet Comments:   Reason for Stopping:         sotalol (BETAPACE) 80 mg tablet Comments:   Reason for Stopping:         metFORMIN (GLUCOPHAGE) 500 mg tablet Comments:   Reason for Stopping:               Procedures : intubation     Consults: Cardiology and Pulmonary/Critical Care      PHYSICAL EXAM   Visit Vitals    /73 (BP 1 Location: Right arm, BP Patient Position: At rest;Supine)    Pulse (!) 106    Temp 97.6 °F (36.4 °C)    Resp 18    Ht 5' 4\" (1.626 m)    Wt 69.9 kg (154 lb)    SpO2 97%    BMI 26.43 kg/m2     General: Awake, cooperative, no acute distress    HEENT: NC, Atraumatic. PERRLA, EOMI. Anicteric sclerae. Lungs:  CTA Bilaterally. No Wheezing/Rhonchi/Rales. Heart:  Regular  rhythm,  + murmur, No Rubs, No Gallops  Abdomen: Soft, Non distended, Non tender. +Bowel sounds,   Extremities: No c/c/e  Psych:   Not anxious or agitated. Neurologic:  No acute neurological deficits. Admission HPI :    Raffaele Waggoner is a 80 y.o. female who hx of cad, MI, DM type II came to Ed due to diarrhea and abdomen pain. she presented sob and desat to 40 % when she got the cr abdomen in ED. She presented respiratory failure and then cardiac arrest. acls was performed. She was intubated in Ed. ekg-1st degree block-afib, cardizem was given. Cxr; indicated pulmonary edema.      Hospital Course :  Patient was intubated due to  Respiratory arrest (s/p pulmonary edema) and cardiac arrest. Intensive care and cardiology was on board. Her a fib resolved per cardizem injection. CTA negative for PE and abdomen ct no acute issue. She was put on flagyl for possible c diff and vanc and zosyn for possible pneumonia. Flagyl was discontinued because she has no diarrhea since she was admitted. She was extubated on 1/17 and doing well on NC 2 L O2 . ECHO was done: Ejection fraction was estimated in the range of 40 % to 45 %. There was hypokinesis of the basal-mid inferoseptal and  apical septal wall(s). .she  F/u with DR. Mayfield at [facility].  Family and patient requested to be transferred to [facility]. Case discussed with cardiology-Dr. Julianna Paris and Dr. Anand Roper (hospitalis) at [facility]. Dr. Venita Painter accepts patient. Dr. Mindi Martinez will see pt as consult. Activity: Activity as tolerated    Diet: Cardiac Diet    Follow-up: per [facility] physician     Disposition: transfer to [facility]      Minutes spent on discharge: 45 min        Labs: Results:       Chemistry Recent Labs      01/18/17   0440  01/17/17   0330  01/16/17   1240   GLU  157*  174*  156*   NA  141  140  139   K  3.3*  3.7  4.5   CL  108  104  102   CO2  26  24  28   BUN  7  12  14   CREA  0.71  0.97  1.07   CA  7.9*  7.9*  10.1   AGAP  7  12  9   BUCR  10*  12  13   AP   --    --   115   TP   --    --   7.7   ALB   --    --   3.5   GLOB   --    --   4.2*   AGRAT   --    --   0.8      CBC w/Diff Recent Labs      01/18/17   0440  01/17/17   0330  01/16/17   1240   WBC  8.0  12.0  7.1   RBC  3.53*  3.81*  4.51   HGB  10.9*  11.7*  14.2   HCT  34.5*  36.0  43.6   PLT  151  186  210   GRANS  68  83*  70   LYMPH  22  13*  21   EOS  3  1  4      Cardiac Enzymes Recent Labs      01/16/17   1240   CPK  56   CKND1  2.5      Coagulation Recent Labs      01/16/17   1240   PTP  11.2*   INR  0.8   APTT  26.6       Lipid Panel No results found for: CHOL, CHOLPOCT, CHOLX, CHLST, CHOLV, 720376, HDL, LDL, NLDLCT, DLDL, LDLC, DLDLP, 813616, VLDLC, VLDL, TGL, TGLX, TRIGL, [id number], TRIGP, TGLPOCT, 431532, CHHD, CHHDX   BNP No results for input(s): BNPP in the last 72 hours. Liver Enzymes Recent Labs      01/16/17   1240   TP  7.7   ALB  3.5   AP  115   SGOT  4*      Thyroid Studies Lab Results   Component Value Date/Time    TSH 1.08 04/04/2015 06:30 AM            Significant Diagnostic Studies: Cta Chest W Wo Cont    Result Date: 1/17/2017  CTA CHEST WITH IV CONTRAST INDICATION: Respiratory distress TECHNIQUE: Volumetric scanning with IV contrast. Thin overlapping coronal and sagittal MIP reconstructions.  One or more dose reduction techniques were used on this CT: automated exposure control, adjustment of the mAs and/or kVp according to patient's size, and iterative reconstruction techniques. The specific techniques utilized on this CT exam have been documented in the patient's electronic medical record. COMPARISON: Chest x-ray of the same day and CT abdomen and pelvis of the same day FINDINGS: General comment regarding exam quality: Overall quality: Satisfactory. Adequacy of bolus: Satisfactory. Adequacy of suspended respiration: Satisfactory. Any other factors affecting exam quality: Bilateral infiltrates and effusions obscure distal visualization. Heart and vasculature: No evidence of pulmonary embolus is identified at the main pulmonary arteries or proximal branches. No aortic aneurysm. No pericardial effusion. Dense coronary artery calcifications. Heart is somewhat enlarged. Remaining mediastinum: Endotracheal tube has tip quite near the joe, perhaps about one half centimeter above. Consider retraction by 2 cm. Nasogastric tube extends into the abdomen on the EG junction. The tip is not seen. No endobronchial lesions. No enlarged mediastinal lymph nodes. Small prevascular and paratracheal nodes Lungs and pleural spaces: Extensive airspace and interstitial densities are present bilaterally. These involve upper lobes posteriorly and lower lobes posteriorly in particular. Small bilateral pleural effusions. Chest wall: No acute or aggressive osseous abnormalities. Visualized upper abdomen: Please see the CT abdomen and pelvis of the same day for this report. IMPRESSION: Endotracheal tube has tip near the joe. Recommend retraction by about 2 cm with a chest x-ray to confirm satisfactory placement. No central pulmonary embolus is identified. Upper and lower lung zone airspace and interstitial opacities thought to be infiltrates with small pleural effusions.      Ct Abd Pelv W Cont    Result Date: 1/17/2017  EXAM: CT of the abdomen and pelvis with IV contrast INDICATION: Periumbilical abdominal pain COMPARISON: CT abdomen and pelvis 4/3/2015 TECHNIQUE: Axial CT imaging of the abdomen and pelvis was performed with intravenous contrast. Multiplanar reformats were generated. One or more dose reduction techniques were used on this CT: automated exposure control, adjustment of the mAs and/or kVp according to patient's size, and iterative reconstruction techniques. The specific techniques utilized on this CT exam have been documented in the patient's electronic medical record. _______________ FINDINGS: LOWER CHEST: By basilar atelectasis or infiltrates and small pleural effusions. Dense aortic, mitral annulus and coronary artery calcifications. Orogastric tube has tip at the distal stomach/proximal duodenum. LIVER, BILIARY: Liver is normal. No biliary dilation. Gallbladder is surgically absent PANCREAS: Normal. SPLEEN: Normal. ADRENALS: Normal. KIDNEYS: Normal. LYMPH NODES: No enlarged lymph nodes. GASTROINTESTINAL TRACT: The GI tract assessment is limited without oral contrast. No evidence of bowel obstruction. No free air or free fluid. No right lower quadrant inflammation. Scattered diverticula without evidence of diverticulitis. PELVIC ORGANS: Status post hysterectomy. No adnexal masses. Mora catheter in decompressed urinary bladder. VASCULATURE: Dense vascular calcifications. Could not exclude areas of narrowing because visualization is limited by dense calcification. Suspect there could be compromise of left iliac artery secondary to calcification and stenosis . BONES: No acute or aggressive osseous abnormalities identified. OTHER: None. _______________     IMPRESSION: No acute findings are identified in the abdomen or pelvis. The nasogastric tube has tip at distal stomach or proximal duodenum. Atelectasis or infiltrates with small effusions at the lung bases. Status post cholecystectomy. Mora catheter in urinary bladder.  Diverticulosis without definite diverticulitis. Xr Chest Port    Result Date: 1/17/2017  Chest AP portable INDICATION: Endotracheal tube. COMPARISON: X-ray 01/16/2017. FINDINGS: Endotracheal tube tip is 1.9 cm from the oje, consider retraction by 1-2 cm for better positioning. NG tube extends into the stomach out of field-of-view. Defibrillator pad and monitoring leads overlie the chest. Cardiac silhouette and pulmonary vascularity are within normal limits. Probable mitral annulus calcifications. There is persistent but significantly improved peribronchial and perihilar opacities and improvement in diffuse haziness and interstitial markings. There is persistent mild retrocardiac left basilar streaky alveolar opacity. No new airspace disease. No evidence for pleural effusion or pneumothorax. IMPRESSION: 1. Support lines and tubes as above, consider retraction of endotracheal tube by 1-2 cm for better positioning. 2. Interval improvement in peribronchial and reticular interstitial markings and haziness suggestive of improving atypical infiltrate. Mild retrocardiac left basilar atelectasis or infiltrate is stable to slightly more conspicuous from comparison exam.    Xr Chest Port    Result Date: 1/16/2017  History: Tube placement Comparison: 8/31/2015. Exam performed AP portable at 1515. Findings: Endotracheal tube tip is 0.4 cm above the joe. NG tube tip passes off the film in the upper abdomen consistent with insertion of the stomach. No significant pneumothorax. No effusion. Heart size normal. There is some engorgement of the pulmonary vascularity centrally. There is increased interstitial changes in both lungs, which may actually be improved from prior. There is subsegmental atelectasis in both midlung fields, worse than prior. Impression: 1. Tubes appear in satisfactory position. No pneumothorax. 2. Interstitial changes could represent developing pulmonary edema but actually appear improved from prior.  3. Minor atelectasis in both lungs, worse than prior.              [facility]     CC: Winifred Herrera MD

## 2017-01-22 LAB
BACTERIA SPEC CULT: NORMAL
BACTERIA SPEC CULT: NORMAL
SERVICE CMNT-IMP: NORMAL
SERVICE CMNT-IMP: NORMAL

## 2017-05-09 ENCOUNTER — HOSPITAL ENCOUNTER (OUTPATIENT)
Dept: LAB | Age: 82
Discharge: HOME | End: 2017-05-09
Payer: MEDICARE

## 2017-05-09 PROCEDURE — 87070 CULTURE OTHR SPECIMN AEROBIC: CPT | Performed by: OPHTHALMOLOGY

## 2017-05-09 PROCEDURE — 87077 CULTURE AEROBIC IDENTIFY: CPT | Performed by: OPHTHALMOLOGY

## 2017-05-09 PROCEDURE — 87186 SC STD MICRODIL/AGAR DIL: CPT | Performed by: OPHTHALMOLOGY

## 2017-05-12 LAB
BACTERIA SPEC CULT: ABNORMAL
SERVICE CMNT-IMP: ABNORMAL

## 2017-05-13 LAB
BACTERIA SPEC CULT: ABNORMAL
SERVICE CMNT-IMP: ABNORMAL

## 2018-02-16 ENCOUNTER — HOSPITAL ENCOUNTER (EMERGENCY)
Age: 83
Discharge: HOME | End: 2018-02-16
Attending: EMERGENCY MEDICINE
Payer: MEDICARE

## 2018-02-16 VITALS
OXYGEN SATURATION: 96 % | DIASTOLIC BLOOD PRESSURE: 63 MMHG | BODY MASS INDEX: 25.16 KG/M2 | WEIGHT: 142 LBS | HEIGHT: 63 IN | RESPIRATION RATE: 20 BRPM | HEART RATE: 64 BPM | TEMPERATURE: 98.3 F | SYSTOLIC BLOOD PRESSURE: 107 MMHG

## 2018-02-16 DIAGNOSIS — N39.0 URINARY TRACT INFECTION WITHOUT HEMATURIA, SITE UNSPECIFIED: Primary | ICD-10-CM

## 2018-02-16 DIAGNOSIS — F22 DELUSIONAL DISORDER (HCC): ICD-10-CM

## 2018-02-16 LAB
ALBUMIN SERPL-MCNC: 3.3 G/DL (ref 3.4–5)
ALBUMIN/GLOB SERPL: 0.8 {RATIO} (ref 0.8–1.7)
ALP SERPL-CCNC: 97 U/L (ref 45–117)
ALT SERPL-CCNC: 24 U/L (ref 13–56)
AMPHET UR QL SCN: NEGATIVE
ANION GAP SERPL CALC-SCNC: 6 MMOL/L (ref 3–18)
APAP SERPL-MCNC: <2 UG/ML (ref 10–30)
APPEARANCE UR: ABNORMAL
AST SERPL-CCNC: 19 U/L (ref 15–37)
BACTERIA URNS QL MICRO: ABNORMAL /HPF
BARBITURATES UR QL SCN: NEGATIVE
BASOPHILS # BLD: 0 K/UL (ref 0–0.06)
BASOPHILS NFR BLD: 1 % (ref 0–2)
BENZODIAZ UR QL: NEGATIVE
BILIRUB SERPL-MCNC: 0.3 MG/DL (ref 0.2–1)
BILIRUB UR QL: NEGATIVE
BUN SERPL-MCNC: 17 MG/DL (ref 7–18)
BUN/CREAT SERPL: 15 (ref 12–20)
CALCIUM SERPL-MCNC: 8.9 MG/DL (ref 8.5–10.1)
CANNABINOIDS UR QL SCN: NEGATIVE
CHLORIDE SERPL-SCNC: 103 MMOL/L (ref 100–108)
CK MB CFR SERPL CALC: 5.7 % (ref 0–4)
CK MB SERPL-MCNC: 1.6 NG/ML (ref 5–25)
CK SERPL-CCNC: 28 U/L (ref 26–192)
CO2 SERPL-SCNC: 28 MMOL/L (ref 21–32)
COCAINE UR QL SCN: NEGATIVE
COLOR UR: YELLOW
CREAT SERPL-MCNC: 1.13 MG/DL (ref 0.6–1.3)
DIFFERENTIAL METHOD BLD: ABNORMAL
EOSINOPHIL # BLD: 0.4 K/UL (ref 0–0.4)
EOSINOPHIL NFR BLD: 7 % (ref 0–5)
EPITH CASTS URNS QL MICRO: ABNORMAL /LPF (ref 0–5)
ERYTHROCYTE [DISTWIDTH] IN BLOOD BY AUTOMATED COUNT: 12.8 % (ref 11.6–14.5)
ETHANOL SERPL-MCNC: <3 MG/DL (ref 0–3)
GLOBULIN SER CALC-MCNC: 4 G/DL (ref 2–4)
GLUCOSE SERPL-MCNC: 97 MG/DL (ref 74–99)
GLUCOSE UR STRIP.AUTO-MCNC: NEGATIVE MG/DL
HCT VFR BLD AUTO: 36.3 % (ref 35–45)
HDSCOM,HDSCOM: NORMAL
HGB BLD-MCNC: 11.5 G/DL (ref 12–16)
HGB UR QL STRIP: NEGATIVE
KETONES UR QL STRIP.AUTO: NEGATIVE MG/DL
LEUKOCYTE ESTERASE UR QL STRIP.AUTO: ABNORMAL
LYMPHOCYTES # BLD: 1.4 K/UL (ref 0.9–3.6)
LYMPHOCYTES NFR BLD: 28 % (ref 21–52)
MCH RBC QN AUTO: 31.3 PG (ref 24–34)
MCHC RBC AUTO-ENTMCNC: 31.7 G/DL (ref 31–37)
MCV RBC AUTO: 98.9 FL (ref 74–97)
METHADONE UR QL: NEGATIVE
MONOCYTES # BLD: 0.2 K/UL (ref 0.05–1.2)
MONOCYTES NFR BLD: 5 % (ref 3–10)
NEUTS SEG # BLD: 3 K/UL (ref 1.8–8)
NEUTS SEG NFR BLD: 59 % (ref 40–73)
NITRITE UR QL STRIP.AUTO: POSITIVE
OPIATES UR QL: NEGATIVE
PCP UR QL: NEGATIVE
PH UR STRIP: 5.5 [PH] (ref 5–8)
PLATELET # BLD AUTO: 211 K/UL (ref 135–420)
PMV BLD AUTO: 10.7 FL (ref 9.2–11.8)
POTASSIUM SERPL-SCNC: 4.9 MMOL/L (ref 3.5–5.5)
PROT SERPL-MCNC: 7.3 G/DL (ref 6.4–8.2)
PROT UR STRIP-MCNC: NEGATIVE MG/DL
RBC # BLD AUTO: 3.67 M/UL (ref 4.2–5.3)
RBC #/AREA URNS HPF: ABNORMAL /HPF (ref 0–5)
SALICYLATES SERPL-MCNC: <2.8 MG/DL (ref 2.8–20)
SODIUM SERPL-SCNC: 137 MMOL/L (ref 136–145)
SP GR UR REFRACTOMETRY: 1.01 (ref 1–1.03)
TROPONIN I SERPL-MCNC: <0.02 NG/ML (ref 0–0.06)
UROBILINOGEN UR QL STRIP.AUTO: 0.2 EU/DL (ref 0.2–1)
WBC # BLD AUTO: 5.1 K/UL (ref 4.6–13.2)
WBC URNS QL MICRO: ABNORMAL /HPF (ref 0–5)

## 2018-02-16 PROCEDURE — 93005 ELECTROCARDIOGRAM TRACING: CPT

## 2018-02-16 PROCEDURE — 80307 DRUG TEST PRSMV CHEM ANLYZR: CPT | Performed by: EMERGENCY MEDICINE

## 2018-02-16 PROCEDURE — 82550 ASSAY OF CK (CPK): CPT | Performed by: EMERGENCY MEDICINE

## 2018-02-16 PROCEDURE — 87086 URINE CULTURE/COLONY COUNT: CPT | Performed by: EMERGENCY MEDICINE

## 2018-02-16 PROCEDURE — 87077 CULTURE AEROBIC IDENTIFY: CPT | Performed by: EMERGENCY MEDICINE

## 2018-02-16 PROCEDURE — 87186 SC STD MICRODIL/AGAR DIL: CPT | Performed by: EMERGENCY MEDICINE

## 2018-02-16 PROCEDURE — 85025 COMPLETE CBC W/AUTO DIFF WBC: CPT | Performed by: EMERGENCY MEDICINE

## 2018-02-16 PROCEDURE — 99283 EMERGENCY DEPT VISIT LOW MDM: CPT

## 2018-02-16 PROCEDURE — 74011250636 HC RX REV CODE- 250/636: Performed by: EMERGENCY MEDICINE

## 2018-02-16 PROCEDURE — 96374 THER/PROPH/DIAG INJ IV PUSH: CPT

## 2018-02-16 PROCEDURE — 81001 URINALYSIS AUTO W/SCOPE: CPT | Performed by: EMERGENCY MEDICINE

## 2018-02-16 PROCEDURE — 80053 COMPREHEN METABOLIC PANEL: CPT | Performed by: EMERGENCY MEDICINE

## 2018-02-16 RX ORDER — SODIUM CHLORIDE 9 MG/ML
INJECTION INTRAMUSCULAR; INTRAVENOUS; SUBCUTANEOUS
Status: DISCONTINUED
Start: 2018-02-16 | End: 2018-02-16 | Stop reason: HOSPADM

## 2018-02-16 RX ORDER — LOSARTAN POTASSIUM 50 MG/1
TABLET ORAL DAILY
COMMUNITY

## 2018-02-16 RX ORDER — SOTALOL HYDROCHLORIDE 80 MG/1
80 TABLET ORAL
COMMUNITY

## 2018-02-16 RX ORDER — CEFTRIAXONE 1 G/1
1 INJECTION, POWDER, FOR SOLUTION INTRAMUSCULAR; INTRAVENOUS
Status: COMPLETED | OUTPATIENT
Start: 2018-02-16 | End: 2018-02-16

## 2018-02-16 RX ORDER — METFORMIN HYDROCHLORIDE 500 MG/1
TABLET ORAL 2 TIMES DAILY WITH MEALS
COMMUNITY

## 2018-02-16 RX ORDER — CEPHALEXIN 500 MG/1
500 CAPSULE ORAL 3 TIMES DAILY
Qty: 21 CAP | Refills: 0 | Status: SHIPPED | OUTPATIENT
Start: 2018-02-16 | End: 2018-02-23

## 2018-02-16 RX ADMIN — CEFTRIAXONE 1 G: 1 INJECTION, POWDER, FOR SOLUTION INTRAMUSCULAR; INTRAVENOUS at 15:34

## 2018-02-16 NOTE — DISCHARGE INSTRUCTIONS

## 2018-02-16 NOTE — ED PROVIDER NOTES
EMERGENCY DEPARTMENT HISTORY AND PHYSICAL EXAM    Date: 2/16/2018  Patient Name: Efrem Castro    History of Presenting Illness     Chief Complaint   Patient presents with    Altered mental status         History Provided By: Patient    Chief Complaint: Hallucinations  Duration: 10 Days  Timing:  Constant  Location: N/A  Quality: N/A  Severity: N/A  Associated Symptoms: denies any other associated signs or symptoms    Additional History (Context):   1:44 PM  Efrem Castro is a 80 y.o. female with PMHx of atrial fibrillation, diabetes, CAD, MI, UTI, neuropathy and high cholesterol who presents to the emergency department C/O hallucinations onset 10 days ago. Associated sxs include confusion. Pt was dx'ed with a UTI 10 days ago on Macrobid and states she has been hallucinating since. Pt called her PCP and was sent here for further evaluation. Pt reports she has bugs/parasites coming out of her mouth. 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 Pt denies slurred speech, dizziness, weakness, fever, and any other sxs or complaints. PCP: Mera Love DO    Current Facility-Administered Medications   Medication Dose Route Frequency Provider Last Rate Last Dose    sodium chloride injection             sodium chloride injection              Current Outpatient Prescriptions   Medication Sig Dispense Refill    sotalol (BETAPACE) 80 mg tablet Take 80 mg by mouth.  metFORMIN (GLUCOPHAGE) 500 mg tablet Take  by mouth two (2) times daily (with meals).  losartan (COZAAR) 50 mg tablet Take  by mouth daily.  cephALEXin (KEFLEX) 500 mg capsule Take 1 Cap by mouth three (3) times daily for 7 days. 21 Cap 0    atorvastatin (LIPITOR) 10 mg tablet Take 10 mg by mouth daily.  escitalopram oxalate (LEXAPRO) 5 mg tablet Take 5 mg by mouth daily.       albuterol-ipratropium (DUO-NEB) 2.5 mg-0.5 mg/3 ml nebu 3 mL by Nebulization route every six (6) hours as needed for Other (wheezing). 30 Nebule 0    metoprolol tartrate (LOPRESSOR) 25 mg tablet Take 0.5 Tabs by mouth every twelve (12) hours. 1 Tab 0    cholecalciferol, VITAMIN D3, (VITAMIN D3) 5,000 unit tab tablet Take 5,000 Units by mouth daily.  aspirin delayed-release 81 mg tablet Take 81 mg by mouth daily.  gentamicin (GARAMYCIN) 0.3 % ophthalmic solution Administer 1 Drop to both eyes four (4) times daily.  gabapentin (NEURONTIN) 300 mg capsule Take 300 mg by mouth three (3) times daily. Past History     Past Medical History:  Past Medical History:   Diagnosis Date    [facility] [address]     CAD (coronary artery disease)     [facility]     High cholesterol     MI (myocardial infarction)     Neuropathy     UTI (urinary tract infection)        Past Surgical History:  Past Surgical History:   Procedure Laterality Date    HX BREAST LUMPECTOMY      HX CHOLECYSTECTOMY      HX CORONARY STENT PLACEMENT      HX HYSTERECTOMY         Family History:  History reviewed. No pertinent family history. Social History:  Social History   Substance Use Topics    Smoking status: Former Smoker    Smokeless tobacco: Never Used    Alcohol use No       Allergies: Allergies   Allergen Reactions    Excedrin [Acetaminophen-Caffeine] Other (comments)     \"climbing the walls\"    Zantac [Ranitidine Hcl] Other (comments)     \"climbing the walls\"         Review of Systems   Review of Systems   Constitutional: Negative for fever. Neurological: Negative for dizziness, speech difficulty (slurred) and weakness. Psychiatric/Behavioral: Positive for confusion and hallucinations. All other systems reviewed and are negative. Physical Exam     Vitals:    02/16/18 1129   BP: 107/63   Pulse: 64   Resp: 20   Temp: 98.3 °F (36.8 °C)   SpO2: 96%   Weight: 64.4 kg (142 lb)   Height: 5' 3\" (1.6 m)     Physical Exam   Constitutional: She appears well-developed and well-nourished.    HENT:   Head: Normocephalic and atraumatic. Right Ear: External ear normal.   Left Ear: External ear normal.   Nose: Nose normal.   Mouth/Throat: Oropharynx is clear and moist.   Eyes: Conjunctivae and EOM are normal. Pupils are equal, round, and reactive to light. Neck: Normal range of motion. Neck supple. No JVD present. No tracheal deviation present. Cardiovascular: Normal rate, regular rhythm, normal heart sounds and intact distal pulses. Exam reveals no gallop and no friction rub. No murmur heard. Pulmonary/Chest: Effort normal and breath sounds normal. No respiratory distress. She has no wheezes. She has no rales. Abdominal: Soft. Bowel sounds are normal. She exhibits no distension and no mass. There is no tenderness. There is no rebound and no guarding. Musculoskeletal: Normal range of motion. She exhibits no edema or tenderness. Neurological: She is alert. She has normal reflexes. No cranial nerve deficit. She exhibits normal muscle tone. Slight slurred speech. CN II-XII intact, no facial droop or asymmetry; No pronator drift; finger-nose-finger intact; good  and equal strength 5/5 bilateral upper and lower extremities; DTRs: 2+ upper and lower extremities, symmetric bilaterally; sensation is intact to light touch and position sense upper and lower extremities symmetric bilaterally. Skin: Skin is warm and dry. No rash noted. Psychiatric: She has a normal mood and affect. Her behavior is normal.   Nursing note and vitals reviewed.         Diagnostic Study Results     Labs -     Recent Results (from the past 12 hour(s))   URINALYSIS W/ RFLX MICROSCOPIC    Collection Time: 02/16/18 11:45 AM   Result Value Ref Range    Color YELLOW      Appearance CLOUDY      Specific gravity 1.014 1.005 - 1.030      pH (UA) 5.5 5.0 - 8.0      Protein NEGATIVE  NEG mg/dL    Glucose NEGATIVE  NEG mg/dL    Ketone NEGATIVE  NEG mg/dL    Bilirubin NEGATIVE  NEG      Blood NEGATIVE  NEG      Urobilinogen 0.2 0.2 - 1.0 EU/dL    Nitrites POSITIVE (A) NEG      Leukocyte Esterase LARGE (A) NEG     URINE MICROSCOPIC ONLY    Collection Time: 02/16/18 11:45 AM   Result Value Ref Range    WBC 41 to 50 0 - 5 /hpf    RBC 0 to 3 0 - 5 /hpf    Epithelial cells FEW 0 - 5 /lpf    Bacteria 3+ (A) NEG /hpf   CBC WITH AUTOMATED DIFF    Collection Time: 02/16/18  2:19 PM   Result Value Ref Range    WBC 5.1 4.6 - 13.2 K/uL    RBC 3.67 (L) 4.20 - 5.30 M/uL    HGB 11.5 (L) 12.0 - 16.0 g/dL    HCT 36.3 35.0 - 45.0 %    MCV 98.9 (H) 74.0 - 97.0 FL    MCH 31.3 24.0 - 34.0 PG    MCHC 31.7 31.0 - 37.0 g/dL    RDW 12.8 11.6 - 14.5 %    PLATELET 138 340 - 424 K/uL    MPV 10.7 9.2 - 11.8 FL    NEUTROPHILS 59 40 - 73 %    LYMPHOCYTES 28 21 - 52 %    MONOCYTES 5 3 - 10 %    EOSINOPHILS 7 (H) 0 - 5 %    BASOPHILS 1 0 - 2 %    ABS. NEUTROPHILS 3.0 1.8 - 8.0 K/UL    ABS. LYMPHOCYTES 1.4 0.9 - 3.6 K/UL    ABS. MONOCYTES 0.2 0.05 - 1.2 K/UL    ABS. EOSINOPHILS 0.4 0.0 - 0.4 K/UL    ABS. BASOPHILS 0.0 0.0 - 0.06 K/UL    DF AUTOMATED     METABOLIC PANEL, COMPREHENSIVE    Collection Time: 02/16/18  2:19 PM   Result Value Ref Range    Sodium 137 136 - 145 mmol/L    Potassium 4.9 3.5 - 5.5 mmol/L    Chloride 103 100 - 108 mmol/L    CO2 28 21 - 32 mmol/L    Anion gap 6 3.0 - 18 mmol/L    Glucose 97 74 - 99 mg/dL    BUN 17 7.0 - 18 MG/DL    Creatinine 1.13 0.6 - 1.3 MG/DL    BUN/Creatinine ratio 15 12 - 20      GFR est AA 55 (L) >60 ml/min/1.73m2    GFR est non-AA 46 (L) >60 ml/min/1.73m2    Calcium 8.9 8.5 - 10.1 MG/DL    Bilirubin, total 0.3 0.2 - 1.0 MG/DL    ALT (SGPT) 24 13 - 56 U/L    AST (SGOT) 19 15 - 37 U/L    Alk.  phosphatase 97 45 - 117 U/L    Protein, total 7.3 6.4 - 8.2 g/dL    Albumin 3.3 (L) 3.4 - 5.0 g/dL    Globulin 4.0 2.0 - 4.0 g/dL    A-G Ratio 0.8 0.8 - 1.7     CARDIAC PANEL,(CK, CKMB & TROPONIN)    Collection Time: 02/16/18  2:19 PM   Result Value Ref Range    CK 28 26 - 192 U/L    CK - MB 1.6 <3.6 ng/ml    CK-MB Index 5.7 (H) 0.0 - 4.0 %    Troponin-I, Qt. <0.02 0.00 - 0.06 NG/ML   ACETAMINOPHEN    Collection Time: 02/16/18  2:19 PM   Result Value Ref Range    Acetaminophen level <2 (L) 10.0 - 30.0 ug/mL   ETHYL ALCOHOL    Collection Time: 02/16/18  2:19 PM   Result Value Ref Range    ALCOHOL(ETHYL),SERUM <3 0 - 3 MG/DL   SALICYLATE    Collection Time: 02/16/18  2:19 PM   Result Value Ref Range    Salicylate level <1.5 (L) 2.8 - 20 MG/DL       Radiologic Studies -   No orders to display     CT Results  (Last 48 hours)    None        CXR Results  (Last 48 hours)    None          Medications given in the ED-  Medications   sodium chloride injection (  Canceled Entry 2/16/18 1530)   sodium chloride injection (  Canceled Entry 2/16/18 1530)   cefTRIAXone (ROCEPHIN) injection 1 g (1 g IntraVENous Given 2/16/18 1534)         Medical Decision Making   I am the first provider for this patient. I reviewed the vital signs, available nursing notes, past medical history, past surgical history, family history and social history. Vital Signs-Reviewed the patient's vital signs. Pulse Oximetry Analysis - 96% on RA     EKG interpretation: (Preliminary)  Rhythm: Sinus bradycardia with 1st degree AV block. Rate: 59 bpm; LVH, poor R wave progression, unchanged from 1/16/17. EKG read by Rigo Torres MD at 3:02 PM     Records Reviewed: Nursing Notes and Old Medical Records    Provider Notes (Medical Decision Making):   INITIAL CLINICAL IMPRESSION and PLANS:  The patient presents with the primary complaint(s) of: hallucinations. The presentation, to include historical aspects and clinical findings are consistent with the DX of hallucinations. However, other possible DX's to consider as primary, associated with, or exacerbated by include:    1. Intoxication  2. Medication side affect  3. Hypoglycemia    Considering the above, my initial management plan to evaluate and therapeutic interventions include the following and as noted in the orders:    1.   Labs: UA, Urine drug screen, Salicylate, Ethyl alcohol, Cardiac panel, urine culture, UA, CMP, CBC  2. EKG    Procedures:  Procedures    ED Course:   1:44 PM Initial assessment performed. The patients presenting problems have been discussed, and they are in agreement with the care plan formulated and outlined with them. I have encouraged them to ask questions as they arise throughout their visit. Diagnosis and Disposition   Discussion:  Patient was stable in the ED. ECG and troponin showed no evidence of ACS. Labs unremarkable U/A showed UTI. Patient was given Rocephin IV and will follow-up with PCP. Patient has delusional disorder without HI or SI. No evidence of self harm or aggressive behavior. DISCHARGE NOTE:  3:50 PM  Carlotta Mac's  results have been reviewed with her. She has been counseled regarding her diagnosis, treatment, and plan. She verbally conveys understanding and agreement of the signs, symptoms, diagnosis, treatment and prognosis and additionally agrees to follow up as discussed. She also agrees with the care-plan and conveys that all of her questions have been answered. I have also provided discharge instructions for her that include: educational information regarding their diagnosis and treatment, and list of reasons why they would want to return to the ED prior to their follow-up appointment, should her condition change. She has been provided with education for proper emergency department utilization. CLINICAL IMPRESSION:    1. Urinary tract infection without hematuria, site unspecified    2. Delusional disorder ([facility] [address]        PLAN:  1. D/C Home  2. Current Discharge Medication List      START taking these medications    Details   cephALEXin (KEFLEX) 500 mg capsule Take 1 Cap by mouth three (3) times daily for 7 days. Qty: 21 Cap, Refills: 0           3.    Follow-up Information     Follow up With Details Comments Marquise 43, DO Schedule an appointment as soon as possible for a visit in 3 days For primary care follow up. [address]      [facility] EMERGENCY DEPT Go to As needed, If symptoms worsen [address] [id number] [phone]        _______________________________    Attestations: This note is prepared by Sarah Diamond, acting as Scribe for Yumiko Pate MD.    Yumiko Pate MD:  The scribe's documentation has been prepared under my direction and personally reviewed by me in its entirety.   I confirm that the note above accurately reflects all work, treatment, procedures, and medical decision making performed by me.  _______________________________

## 2018-02-16 NOTE — ED TRIAGE NOTES
Patient was diagnosed with a urinary tract infection 10 days ago and states that she has been having hallucinations. Patient was sent here by PCP. Sepsis Screening completed    (  )Patient meets SIRS criteria. ( x )Patient does not meet SIRS criteria.       SIRS Criteria is achieved when two or more of the following are present   Temperature < 96.8°F (36°C) or > 100.9°F (38.3°C)   Heart Rate > 90 beats per minute   Respiratory Rate > 20 breaths per minute   WBC count > 12,000 or <4,000 or > 10% bands

## 2018-02-18 LAB
BACTERIA SPEC CULT: ABNORMAL
SERVICE CMNT-IMP: ABNORMAL

## 2018-02-24 LAB
ATRIAL RATE: 59 BPM
CALCULATED P AXIS, ECG09: 68 DEGREES
CALCULATED R AXIS, ECG10: -32 DEGREES
CALCULATED T AXIS, ECG11: 49 DEGREES
DIAGNOSIS, 93000: NORMAL
P-R INTERVAL, ECG05: 238 MS
Q-T INTERVAL, ECG07: 478 MS
QRS DURATION, ECG06: 118 MS
QTC CALCULATION (BEZET), ECG08: 473 MS
VENTRICULAR RATE, ECG03: 59 BPM

## 2022-03-19 PROBLEM — R19.7 DIARRHEA: Status: ACTIVE | Noted: 2017-01-16

## 2022-03-19 PROBLEM — I46.9 CARDIAC ARREST (HCC): Status: ACTIVE | Noted: 2017-01-16

## 2022-03-19 PROBLEM — I48.91 A-FIB (HCC): Status: ACTIVE | Noted: 2017-01-16

## 2022-03-20 PROBLEM — J81.1 PULMONARY EDEMA: Status: ACTIVE | Noted: 2017-01-16

## 2022-03-20 PROBLEM — J96.01 ACUTE RESPIRATORY FAILURE WITH HYPOXIA (HCC): Status: ACTIVE | Noted: 2017-01-16

## 2023-05-29 RX ORDER — ATORVASTATIN CALCIUM 10 MG/1
10 TABLET, FILM COATED ORAL DAILY
COMMUNITY

## 2023-05-29 RX ORDER — GENTAMICIN SULFATE 3 MG/ML
1 SOLUTION/ DROPS OPHTHALMIC 4 TIMES DAILY
COMMUNITY

## 2023-05-29 RX ORDER — ASPIRIN 81 MG/1
81 TABLET ORAL DAILY
COMMUNITY

## 2023-05-29 RX ORDER — LOSARTAN POTASSIUM 50 MG/1
TABLET ORAL DAILY
COMMUNITY

## 2023-05-29 RX ORDER — IPRATROPIUM BROMIDE AND ALBUTEROL SULFATE 2.5; .5 MG/3ML; MG/3ML
3 SOLUTION RESPIRATORY (INHALATION) EVERY 6 HOURS PRN
COMMUNITY
Start: 2017-01-18

## 2023-05-29 RX ORDER — ESCITALOPRAM OXALATE 5 MG/1
5 TABLET ORAL DAILY
COMMUNITY

## 2023-05-29 RX ORDER — GABAPENTIN 300 MG/1
300 CAPSULE ORAL 3 TIMES DAILY
COMMUNITY

## 2023-05-29 RX ORDER — SOTALOL HYDROCHLORIDE 80 MG/1
80 TABLET ORAL
COMMUNITY